# Patient Record
Sex: MALE | Race: OTHER | NOT HISPANIC OR LATINO
[De-identification: names, ages, dates, MRNs, and addresses within clinical notes are randomized per-mention and may not be internally consistent; named-entity substitution may affect disease eponyms.]

---

## 2017-01-11 ENCOUNTER — OTHER (OUTPATIENT)
Age: 27
End: 2017-01-11

## 2017-01-11 ENCOUNTER — LABORATORY RESULT (OUTPATIENT)
Age: 27
End: 2017-01-11

## 2017-01-12 LAB
25(OH)D3 SERPL-MCNC: 16 NG/ML
ALBUMIN SERPL ELPH-MCNC: 4.4 G/DL
ALP BLD-CCNC: 67 U/L
ALT SERPL-CCNC: 24 U/L
AMYLASE/CREAT SERPL: 46 U/L
ANION GAP SERPL CALC-SCNC: 14 MMOL/L
APPEARANCE: CLEAR
AST SERPL-CCNC: 16 U/L
BASOPHILS # BLD AUTO: 0.04 K/UL
BASOPHILS NFR BLD AUTO: 0.8 %
BILIRUB SERPL-MCNC: 0.8 MG/DL
BILIRUBIN URINE: NEGATIVE
BLOOD URINE: NEGATIVE
BUN SERPL-MCNC: 15 MG/DL
CALCIUM SERPL-MCNC: 9.4 MG/DL
CD3 CELLS # BLD: 2026 /UL
CD3 CELLS NFR BLD: 81 %
CD3+CD4+ CELLS # BLD: 622 /UL
CD3+CD4+ CELLS NFR BLD: 25 %
CD3+CD4+ CELLS/CD3+CD8+ CLL SPEC: 0.47 RATIO
CD3+CD8+ CELLS # SPEC: 1330 /UL
CD3+CD8+ CELLS NFR BLD: 53 %
CHLORIDE SERPL-SCNC: 103 MMOL/L
CHOLEST SERPL-MCNC: 196 MG/DL
CHOLEST/HDLC SERPL: 2.6 RATIO
CO2 SERPL-SCNC: 25 MMOL/L
COLOR: YELLOW
CREAT SERPL-MCNC: 1.26 MG/DL
EOSINOPHIL # BLD AUTO: 0.2 K/UL
EOSINOPHIL NFR BLD AUTO: 3.9 %
GLUCOSE QUALITATIVE U: NORMAL MG/DL
GLUCOSE SERPL-MCNC: 83 MG/DL
HAV IGG+IGM SER QL: REACTIVE
HBA1C MFR BLD HPLC: 5.5 %
HBV CORE IGG+IGM SER QL: NONREACTIVE
HBV SURFACE AB SERPL IA-ACNC: 48.2 MIU/ML
HBV SURFACE AG SER QL: NONREACTIVE
HCT VFR BLD CALC: 42.2 %
HCV AB SER QL: NONREACTIVE
HCV S/CO RATIO: 0.13 S/CO
HDLC SERPL-MCNC: 76 MG/DL
HGB BLD-MCNC: 14.2 G/DL
IMM GRANULOCYTES NFR BLD AUTO: 0.2 %
KETONES URINE: NEGATIVE
LDLC SERPL CALC-MCNC: 108 MG/DL
LEUKOCYTE ESTERASE URINE: NEGATIVE
LPL SERPL-CCNC: 20 U/L
LYMPHOCYTES # BLD AUTO: 2.28 K/UL
LYMPHOCYTES NFR BLD AUTO: 44.7 %
MAN DIFF?: NORMAL
MCHC RBC-ENTMCNC: 31.2 PG
MCHC RBC-ENTMCNC: 33.6 GM/DL
MCV RBC AUTO: 92.7 FL
MONOCYTES # BLD AUTO: 0.55 K/UL
MONOCYTES NFR BLD AUTO: 10.8 %
NEUTROPHILS # BLD AUTO: 2.02 K/UL
NEUTROPHILS NFR BLD AUTO: 39.6 %
NITRITE URINE: NEGATIVE
PH URINE: 8
PLATELET # BLD AUTO: 256 K/UL
POTASSIUM SERPL-SCNC: 4.7 MMOL/L
PROT SERPL-MCNC: 7.3 G/DL
PROTEIN URINE: NEGATIVE MG/DL
RBC # BLD: 4.55 M/UL
RBC # FLD: 13.3 %
SODIUM SERPL-SCNC: 142 MMOL/L
SPECIFIC GRAVITY URINE: 1.01
T PALLIDUM AB SER QL IA: POSITIVE
TRIGL SERPL-MCNC: 58 MG/DL
TSH SERPL-ACNC: 0.97 UIU/ML
UROBILINOGEN URINE: NORMAL MG/DL
WBC # FLD AUTO: 5.1 K/UL

## 2017-01-13 LAB
ADJUSTED MITOGEN: 6.05 IU/ML
ADJUSTED TB AG: 0 IU/ML
C TRACH DNA SPEC QL NAA+PROBE: NORMAL
C TRACH RRNA SPEC QL NAA+PROBE: NORMAL
HIV1 RNA # SERPL NAA+PROBE: NORMAL COPIES/ML
M TB IFN-G BLD-IMP: NEGATIVE
N GONORRHOEA DNA SPEC QL NAA+PROBE: NORMAL
N GONORRHOEA RRNA SPEC QL NAA+PROBE: NORMAL
QUANTIFERON GOLD NIL: 0.06 IU/ML
SOURCE AMPLIFICATION: NORMAL
VIRAL LOAD LOG: <1.6 LG10COP/ML

## 2017-01-31 DIAGNOSIS — R79.89 OTHER SPECIFIED ABNORMAL FINDINGS OF BLOOD CHEMISTRY: ICD-10-CM

## 2017-02-02 ENCOUNTER — OTHER (OUTPATIENT)
Age: 27
End: 2017-02-02

## 2017-02-06 ENCOUNTER — APPOINTMENT (OUTPATIENT)
Dept: PEDIATRIC ALLERGY IMMUNOLOGY | Facility: CLINIC | Age: 27
End: 2017-02-06

## 2017-02-06 ENCOUNTER — OUTPATIENT (OUTPATIENT)
Dept: OUTPATIENT SERVICES | Facility: HOSPITAL | Age: 27
LOS: 1 days | End: 2017-02-06
Payer: MEDICAID

## 2017-02-06 ENCOUNTER — NON-APPOINTMENT (OUTPATIENT)
Age: 27
End: 2017-02-06

## 2017-02-06 VITALS — SYSTOLIC BLOOD PRESSURE: 136 MMHG | DIASTOLIC BLOOD PRESSURE: 83 MMHG

## 2017-02-06 VITALS
BODY MASS INDEX: 27.93 KG/M2 | SYSTOLIC BLOOD PRESSURE: 148 MMHG | OXYGEN SATURATION: 97 % | WEIGHT: 203.99 LBS | HEIGHT: 71.57 IN | HEART RATE: 85 BPM | DIASTOLIC BLOOD PRESSURE: 83 MMHG

## 2017-02-06 DIAGNOSIS — Z23 ENCOUNTER FOR IMMUNIZATION: ICD-10-CM

## 2017-02-06 PROCEDURE — G0463: CPT

## 2017-02-06 PROCEDURE — 36415 COLL VENOUS BLD VENIPUNCTURE: CPT

## 2017-02-07 DIAGNOSIS — Z21 ASYMPTOMATIC HUMAN IMMUNODEFICIENCY VIRUS [HIV] INFECTION STATUS: ICD-10-CM

## 2017-02-07 DIAGNOSIS — Z11.3 ENCOUNTER FOR SCREENING FOR INFECTIONS WITH A PREDOMINANTLY SEXUAL MODE OF TRANSMISSION: ICD-10-CM

## 2017-02-07 DIAGNOSIS — R85.610 ATYPICAL SQUAMOUS CELLS OF UNDETERMINED SIGNIFICANCE ON CYTOLOGIC SMEAR OF ANUS (ASC-US): ICD-10-CM

## 2017-02-07 DIAGNOSIS — E55.9 VITAMIN D DEFICIENCY, UNSPECIFIED: ICD-10-CM

## 2017-02-07 DIAGNOSIS — J45.909 UNSPECIFIED ASTHMA, UNCOMPLICATED: ICD-10-CM

## 2017-02-08 LAB
C TRACH DNA SPEC QL NAA+PROBE: NORMAL
C TRACH DNA SPEC QL NAA+PROBE: NORMAL
C TRACH RRNA SPEC QL NAA+PROBE: NORMAL
C TRACH RRNA SPEC QL NAA+PROBE: NORMAL
N GONORRHOEA DNA SPEC QL NAA+PROBE: NORMAL
N GONORRHOEA DNA SPEC QL NAA+PROBE: NORMAL
N GONORRHOEA RRNA SPEC QL NAA+PROBE: NORMAL
N GONORRHOEA RRNA SPEC QL NAA+PROBE: NORMAL
SOURCE AMPLIFICATION: NORMAL
SOURCE AMPLIFICATION: NORMAL

## 2017-03-06 ENCOUNTER — NON-APPOINTMENT (OUTPATIENT)
Age: 27
End: 2017-03-06

## 2017-03-06 ENCOUNTER — APPOINTMENT (OUTPATIENT)
Dept: PEDIATRIC ALLERGY IMMUNOLOGY | Facility: CLINIC | Age: 27
End: 2017-03-06

## 2017-03-06 ENCOUNTER — APPOINTMENT (OUTPATIENT)
Dept: COLORECTAL SURGERY | Facility: CLINIC | Age: 27
End: 2017-03-06

## 2017-03-06 ENCOUNTER — OUTPATIENT (OUTPATIENT)
Dept: OUTPATIENT SERVICES | Facility: HOSPITAL | Age: 27
LOS: 1 days | End: 2017-03-06
Payer: MEDICAID

## 2017-03-06 VITALS
DIASTOLIC BLOOD PRESSURE: 85 MMHG | BODY MASS INDEX: 28.07 KG/M2 | HEART RATE: 74 BPM | HEIGHT: 71.57 IN | SYSTOLIC BLOOD PRESSURE: 132 MMHG | OXYGEN SATURATION: 98 % | WEIGHT: 204.99 LBS

## 2017-03-06 DIAGNOSIS — R85.610 ATYPICAL SQUAMOUS CELLS OF UNDETERMINED SIGNIFICANCE ON CYTOLOGIC SMEAR OF ANUS (ASC-US): ICD-10-CM

## 2017-03-06 PROCEDURE — 36415 COLL VENOUS BLD VENIPUNCTURE: CPT

## 2017-03-06 PROCEDURE — G0463: CPT

## 2017-04-03 ENCOUNTER — OTHER (OUTPATIENT)
Age: 27
End: 2017-04-03

## 2017-04-06 ENCOUNTER — RX RENEWAL (OUTPATIENT)
Age: 27
End: 2017-04-06

## 2017-04-12 ENCOUNTER — RX RENEWAL (OUTPATIENT)
Age: 27
End: 2017-04-12

## 2017-04-13 ENCOUNTER — RX RENEWAL (OUTPATIENT)
Age: 27
End: 2017-04-13

## 2017-04-13 RX ORDER — MOMETASONE FUROATE 110 UG/1
110 INHALANT RESPIRATORY (INHALATION)
Qty: 1 | Refills: 2 | Status: DISCONTINUED | COMMUNITY
Start: 2017-04-12 | End: 2017-04-13

## 2017-05-12 DIAGNOSIS — E55.9 VITAMIN D DEFICIENCY, UNSPECIFIED: ICD-10-CM

## 2017-05-12 DIAGNOSIS — Z21 ASYMPTOMATIC HUMAN IMMUNODEFICIENCY VIRUS [HIV] INFECTION STATUS: ICD-10-CM

## 2017-05-12 DIAGNOSIS — Z70.8 OTHER SEX COUNSELING: ICD-10-CM

## 2017-05-31 ENCOUNTER — RECORD ABSTRACTING (OUTPATIENT)
Age: 27
End: 2017-05-31

## 2017-06-05 ENCOUNTER — APPOINTMENT (OUTPATIENT)
Dept: PEDIATRIC ALLERGY IMMUNOLOGY | Facility: CLINIC | Age: 27
End: 2017-06-05

## 2017-06-07 ENCOUNTER — RECORD ABSTRACTING (OUTPATIENT)
Age: 27
End: 2017-06-07

## 2017-06-08 ENCOUNTER — APPOINTMENT (OUTPATIENT)
Dept: PEDIATRIC ALLERGY IMMUNOLOGY | Facility: CLINIC | Age: 27
End: 2017-06-08

## 2017-06-08 ENCOUNTER — OTHER (OUTPATIENT)
Age: 27
End: 2017-06-08

## 2017-06-08 ENCOUNTER — LABORATORY RESULT (OUTPATIENT)
Age: 27
End: 2017-06-08

## 2017-06-08 ENCOUNTER — OUTPATIENT (OUTPATIENT)
Dept: OUTPATIENT SERVICES | Facility: HOSPITAL | Age: 27
LOS: 1 days | End: 2017-06-08
Payer: MEDICAID

## 2017-06-08 VITALS
HEART RATE: 86 BPM | DIASTOLIC BLOOD PRESSURE: 82 MMHG | HEIGHT: 71.57 IN | WEIGHT: 203 LBS | SYSTOLIC BLOOD PRESSURE: 128 MMHG | OXYGEN SATURATION: 98 % | BODY MASS INDEX: 27.8 KG/M2

## 2017-06-08 PROCEDURE — G0463: CPT

## 2017-06-08 PROCEDURE — 36415 COLL VENOUS BLD VENIPUNCTURE: CPT

## 2017-06-08 RX ORDER — FEXOFENADINE HYDROCHLORIDE 180 MG/1
180 TABLET ORAL DAILY
Qty: 30 | Refills: 2 | Status: COMPLETED | COMMUNITY
Start: 2017-02-06 | End: 2017-06-08

## 2017-06-09 DIAGNOSIS — Z21 ASYMPTOMATIC HUMAN IMMUNODEFICIENCY VIRUS [HIV] INFECTION STATUS: ICD-10-CM

## 2017-06-09 DIAGNOSIS — Z11.3 ENCOUNTER FOR SCREENING FOR INFECTIONS WITH A PREDOMINANTLY SEXUAL MODE OF TRANSMISSION: ICD-10-CM

## 2017-06-09 DIAGNOSIS — E55.9 VITAMIN D DEFICIENCY, UNSPECIFIED: ICD-10-CM

## 2017-06-09 DIAGNOSIS — Z12.12 ENCOUNTER FOR SCREENING FOR MALIGNANT NEOPLASM OF RECTUM: ICD-10-CM

## 2017-06-09 DIAGNOSIS — Z51.81 ENCOUNTER FOR THERAPEUTIC DRUG LEVEL MONITORING: ICD-10-CM

## 2017-06-09 LAB
25(OH)D3 SERPL-MCNC: 33.8 NG/ML
ALBUMIN SERPL ELPH-MCNC: 4.8 G/DL
ALP BLD-CCNC: 87 U/L
ALT SERPL-CCNC: 46 U/L
ANION GAP SERPL CALC-SCNC: 15 MMOL/L
APPEARANCE: CLEAR
AST SERPL-CCNC: 30 U/L
BASOPHILS # BLD AUTO: 0.02 K/UL
BASOPHILS NFR BLD AUTO: 0.4 %
BILIRUB SERPL-MCNC: 0.4 MG/DL
BILIRUBIN URINE: NEGATIVE
BLOOD URINE: NEGATIVE
BUN SERPL-MCNC: 11 MG/DL
CALCIUM SERPL-MCNC: 9.5 MG/DL
CHLORIDE SERPL-SCNC: 103 MMOL/L
CHOLEST SERPL-MCNC: 167 MG/DL
CHOLEST/HDLC SERPL: 1.9 RATIO
CO2 SERPL-SCNC: 24 MMOL/L
COLOR: YELLOW
CORE LAB FLUID CYTOLOGY: NORMAL
CREAT SERPL-MCNC: 1.14 MG/DL
EOSINOPHIL # BLD AUTO: 0.21 K/UL
EOSINOPHIL NFR BLD AUTO: 3.9 %
GLUCOSE QUALITATIVE U: NORMAL MG/DL
GLUCOSE SERPL-MCNC: 88 MG/DL
HCT VFR BLD CALC: 41.2 %
HDLC SERPL-MCNC: 86 MG/DL
HGB BLD-MCNC: 14.1 G/DL
IMM GRANULOCYTES NFR BLD AUTO: 0.2 %
KETONES URINE: NEGATIVE
LDLC SERPL CALC-MCNC: 59 MG/DL
LEUKOCYTE ESTERASE URINE: NEGATIVE
LPL SERPL-CCNC: 21 U/L
LYMPHOCYTES # BLD AUTO: 1.59 K/UL
LYMPHOCYTES NFR BLD AUTO: 29.2 %
MAN DIFF?: NORMAL
MCHC RBC-ENTMCNC: 32.3 PG
MCHC RBC-ENTMCNC: 34.2 GM/DL
MCV RBC AUTO: 94.3 FL
MONOCYTES # BLD AUTO: 0.63 K/UL
MONOCYTES NFR BLD AUTO: 11.6 %
NEUTROPHILS # BLD AUTO: 2.98 K/UL
NEUTROPHILS NFR BLD AUTO: 54.7 %
NITRITE URINE: NEGATIVE
PH URINE: 7.5
PHOSPHATE SERPL-MCNC: 3.3 MG/DL
PLATELET # BLD AUTO: 296 K/UL
POTASSIUM SERPL-SCNC: 4.7 MMOL/L
PROT SERPL-MCNC: 7.6 G/DL
PROTEIN URINE: NEGATIVE MG/DL
RBC # BLD: 4.37 M/UL
RBC # FLD: 12.9 %
SODIUM SERPL-SCNC: 142 MMOL/L
SPECIFIC GRAVITY URINE: 1.01
T PALLIDUM AB SER QL IA: POSITIVE
TRIGL SERPL-MCNC: 112 MG/DL
UROBILINOGEN URINE: NORMAL MG/DL
WBC # FLD AUTO: 5.44 K/UL

## 2017-06-12 LAB
C TRACH RRNA SPEC QL NAA+PROBE: NORMAL
C TRACH RRNA SPEC QL NAA+PROBE: NORMAL
CD3 CELLS # BLD: 1249 /UL
CD3 CELLS NFR BLD: 74 %
CD3+CD4+ CELLS # BLD: 473 /UL
CD3+CD4+ CELLS NFR BLD: 28 %
CD3+CD4+ CELLS/CD3+CD8+ CLL SPEC: 0.63 RATIO
CD3+CD8+ CELLS # SPEC: 753 /UL
CD3+CD8+ CELLS NFR BLD: 45 %
N GONORRHOEA RRNA SPEC QL NAA+PROBE: NORMAL
N GONORRHOEA RRNA SPEC QL NAA+PROBE: NORMAL
SOURCE AMPLIFICATION: NORMAL
SOURCE AMPLIFICATION: NORMAL

## 2017-06-15 ENCOUNTER — RECORD ABSTRACTING (OUTPATIENT)
Age: 27
End: 2017-06-15

## 2017-08-24 ENCOUNTER — RECORD ABSTRACTING (OUTPATIENT)
Age: 27
End: 2017-08-24

## 2017-08-24 LAB
HIV1 RNA # SERPL NAA+PROBE: NOT DETECTED COPIES/ML
VIRAL LOAD LOG: NOT DETECTED LG10COP/ML

## 2017-09-12 ENCOUNTER — LABORATORY RESULT (OUTPATIENT)
Age: 27
End: 2017-09-12

## 2017-09-12 ENCOUNTER — OUTPATIENT (OUTPATIENT)
Dept: OUTPATIENT SERVICES | Facility: HOSPITAL | Age: 27
LOS: 1 days | End: 2017-09-12
Payer: MEDICAID

## 2017-09-12 ENCOUNTER — APPOINTMENT (OUTPATIENT)
Dept: PEDIATRIC ALLERGY IMMUNOLOGY | Facility: CLINIC | Age: 27
End: 2017-09-12
Payer: MEDICAID

## 2017-09-12 VITALS
HEART RATE: 76 BPM | HEIGHT: 71.57 IN | SYSTOLIC BLOOD PRESSURE: 136 MMHG | DIASTOLIC BLOOD PRESSURE: 87 MMHG | BODY MASS INDEX: 27.11 KG/M2 | OXYGEN SATURATION: 98 % | WEIGHT: 198 LBS

## 2017-09-12 VITALS — TEMPERATURE: 96.8 F

## 2017-09-12 DIAGNOSIS — R09.81 NASAL CONGESTION: ICD-10-CM

## 2017-09-12 PROCEDURE — 99215 OFFICE O/P EST HI 40 MIN: CPT

## 2017-09-12 PROCEDURE — G0463: CPT

## 2017-09-12 PROCEDURE — 36415 COLL VENOUS BLD VENIPUNCTURE: CPT

## 2017-09-13 DIAGNOSIS — Z71.7 HUMAN IMMUNODEFICIENCY VIRUS [HIV] COUNSELING: ICD-10-CM

## 2017-09-13 DIAGNOSIS — B20 HUMAN IMMUNODEFICIENCY VIRUS [HIV] DISEASE: ICD-10-CM

## 2017-09-13 DIAGNOSIS — Z86.39 PERSONAL HISTORY OF OTHER ENDOCRINE, NUTRITIONAL AND METABOLIC DISEASE: ICD-10-CM

## 2017-09-13 DIAGNOSIS — R09.81 NASAL CONGESTION: ICD-10-CM

## 2017-09-13 DIAGNOSIS — Z51.81 ENCOUNTER FOR THERAPEUTIC DRUG LEVEL MONITORING: ICD-10-CM

## 2017-09-13 DIAGNOSIS — Z11.3 ENCOUNTER FOR SCREENING FOR INFECTIONS WITH A PREDOMINANTLY SEXUAL MODE OF TRANSMISSION: ICD-10-CM

## 2017-09-14 DIAGNOSIS — R79.89 OTHER SPECIFIED ABNORMAL FINDINGS OF BLOOD CHEMISTRY: ICD-10-CM

## 2017-09-15 LAB
ALBUMIN SERPL ELPH-MCNC: 4.4 G/DL
ALP BLD-CCNC: 86 U/L
ALT SERPL-CCNC: 95 U/L
ANION GAP SERPL CALC-SCNC: 13 MMOL/L
APPEARANCE: ABNORMAL
AST SERPL-CCNC: 64 U/L
BASOPHILS # BLD AUTO: 0.02 K/UL
BASOPHILS NFR BLD AUTO: 0.4 %
BILIRUB SERPL-MCNC: 0.5 MG/DL
BILIRUBIN URINE: NEGATIVE
BLOOD URINE: NEGATIVE
BUN SERPL-MCNC: 15 MG/DL
C TRACH RRNA SPEC QL NAA+PROBE: NORMAL
C TRACH RRNA SPEC QL NAA+PROBE: NORMAL
CALCIUM SERPL-MCNC: 9.7 MG/DL
CD3 CELLS # BLD: 1743 /UL
CD3 CELLS NFR BLD: 82 %
CD3+CD4+ CELLS # BLD: 635 /UL
CD3+CD4+ CELLS NFR BLD: 30 %
CD3+CD4+ CELLS/CD3+CD8+ CLL SPEC: 0.59 RATIO
CD3+CD8+ CELLS # SPEC: 1069 /UL
CD3+CD8+ CELLS NFR BLD: 50 %
CHLORIDE SERPL-SCNC: 101 MMOL/L
CHOLEST SERPL-MCNC: 166 MG/DL
CHOLEST/HDLC SERPL: 1.7 RATIO
CO2 SERPL-SCNC: 26 MMOL/L
COLOR: YELLOW
CREAT SERPL-MCNC: 1.1 MG/DL
EOSINOPHIL # BLD AUTO: 0.2 K/UL
EOSINOPHIL NFR BLD AUTO: 4.2 %
GLUCOSE QUALITATIVE U: NORMAL MG/DL
GLUCOSE SERPL-MCNC: 78 MG/DL
HCT VFR BLD CALC: 41 %
HDLC SERPL-MCNC: 96 MG/DL
HGB BLD-MCNC: 13.8 G/DL
HIV1 RNA # SERPL NAA+PROBE: <30 COPIES/ML
HIV1 RNA # SERPL NAA+PROBE: ABNORMAL
IMM GRANULOCYTES NFR BLD AUTO: 0.2 %
KETONES URINE: NEGATIVE
LDLC SERPL CALC-MCNC: 57 MG/DL
LEUKOCYTE ESTERASE URINE: NEGATIVE
LPL SERPL-CCNC: 23 U/L
LYMPHOCYTES # BLD AUTO: 2.05 K/UL
LYMPHOCYTES NFR BLD AUTO: 43.2 %
MAN DIFF?: NORMAL
MCHC RBC-ENTMCNC: 32.4 PG
MCHC RBC-ENTMCNC: 33.7 GM/DL
MCV RBC AUTO: 96.2 FL
MONOCYTES # BLD AUTO: 0.65 K/UL
MONOCYTES NFR BLD AUTO: 13.7 %
N GONORRHOEA RRNA SPEC QL NAA+PROBE: NORMAL
N GONORRHOEA RRNA SPEC QL NAA+PROBE: NORMAL
NEUTROPHILS # BLD AUTO: 1.81 K/UL
NEUTROPHILS NFR BLD AUTO: 38.3 %
NITRITE URINE: NEGATIVE
PH URINE: 7.5
PLATELET # BLD AUTO: 251 K/UL
POTASSIUM SERPL-SCNC: 4.3 MMOL/L
PROT SERPL-MCNC: 7.4 G/DL
PROTEIN URINE: NEGATIVE MG/DL
RBC # BLD: 4.26 M/UL
RBC # FLD: 13 %
SODIUM SERPL-SCNC: 140 MMOL/L
SOURCE AMPLIFICATION: NORMAL
SOURCE AMPLIFICATION: NORMAL
SPECIFIC GRAVITY URINE: 1.01
T PALLIDUM AB SER QL IA: POSITIVE
TRIGL SERPL-MCNC: 67 MG/DL
UROBILINOGEN URINE: NORMAL MG/DL
VIRAL LOAD INTERP: NORMAL
VIRAL LOAD LOG: <1.47 LG COP/ML
WBC # FLD AUTO: 4.74 K/UL

## 2017-11-28 ENCOUNTER — RX RENEWAL (OUTPATIENT)
Age: 27
End: 2017-11-28

## 2018-01-31 ENCOUNTER — OTHER (OUTPATIENT)
Age: 28
End: 2018-01-31

## 2018-04-11 ENCOUNTER — RX RENEWAL (OUTPATIENT)
Age: 28
End: 2018-04-11

## 2018-04-19 ENCOUNTER — LABORATORY RESULT (OUTPATIENT)
Age: 28
End: 2018-04-19

## 2018-04-19 ENCOUNTER — APPOINTMENT (OUTPATIENT)
Dept: PEDIATRIC ALLERGY IMMUNOLOGY | Facility: CLINIC | Age: 28
End: 2018-04-19
Payer: COMMERCIAL

## 2018-04-19 ENCOUNTER — MED ADMIN CHARGE (OUTPATIENT)
Age: 28
End: 2018-04-19

## 2018-04-19 ENCOUNTER — OUTPATIENT (OUTPATIENT)
Dept: OUTPATIENT SERVICES | Facility: HOSPITAL | Age: 28
LOS: 1 days | End: 2018-04-19
Payer: COMMERCIAL

## 2018-04-19 VITALS
WEIGHT: 205.6 LBS | SYSTOLIC BLOOD PRESSURE: 137 MMHG | HEART RATE: 70 BPM | DIASTOLIC BLOOD PRESSURE: 84 MMHG | BODY MASS INDEX: 28.22 KG/M2

## 2018-04-19 DIAGNOSIS — Z71.7 HUMAN IMMUNODEFICIENCY VIRUS [HIV] COUNSELING: ICD-10-CM

## 2018-04-19 DIAGNOSIS — Z23 ENCOUNTER FOR IMMUNIZATION: ICD-10-CM

## 2018-04-19 DIAGNOSIS — F12.90 CANNABIS USE, UNSPECIFIED, UNCOMPLICATED: ICD-10-CM

## 2018-04-19 DIAGNOSIS — Z78.9 OTHER SPECIFIED HEALTH STATUS: ICD-10-CM

## 2018-04-19 DIAGNOSIS — Z12.12 ENCOUNTER FOR SCREENING FOR MALIGNANT NEOPLASM OF RECTUM: ICD-10-CM

## 2018-04-19 PROCEDURE — 90471 IMMUNIZATION ADMIN: CPT

## 2018-04-19 PROCEDURE — 99215 OFFICE O/P EST HI 40 MIN: CPT

## 2018-04-19 PROCEDURE — 36415 COLL VENOUS BLD VENIPUNCTURE: CPT

## 2018-04-19 PROCEDURE — G0463: CPT | Mod: 25

## 2018-04-19 PROCEDURE — 90732 PPSV23 VACC 2 YRS+ SUBQ/IM: CPT

## 2018-04-19 RX ORDER — BROMPHENIRAMINE MALEATE, PSEUDOEPHEDRINE HYDROCHLORIDE, 2; 30; 10 MG/5ML; MG/5ML; MG/5ML
30-2-10 SYRUP ORAL
Qty: 236 | Refills: 0 | Status: DISCONTINUED | COMMUNITY
Start: 2018-02-08

## 2018-04-19 RX ORDER — AMOXICILLIN AND CLAVULANATE POTASSIUM 875; 125 MG/1; MG/1
875-125 TABLET, COATED ORAL
Qty: 20 | Refills: 0 | Status: DISCONTINUED | COMMUNITY
Start: 2018-02-08

## 2018-04-20 LAB
25(OH)D3 SERPL-MCNC: 25.1 NG/ML
ALBUMIN SERPL ELPH-MCNC: 4.6 G/DL
ALP BLD-CCNC: 65 U/L
ALT SERPL-CCNC: 53 U/L
ANION GAP SERPL CALC-SCNC: 17 MMOL/L
APPEARANCE: CLEAR
AST SERPL-CCNC: 36 U/L
BASOPHILS # BLD AUTO: 0.04 K/UL
BASOPHILS NFR BLD AUTO: 0.6 %
BILIRUB SERPL-MCNC: 0.5 MG/DL
BILIRUBIN URINE: NEGATIVE
BLOOD URINE: NEGATIVE
BUN SERPL-MCNC: 11 MG/DL
C TRACH RRNA SPEC QL NAA+PROBE: NOT DETECTED
CALCIUM SERPL-MCNC: 9.5 MG/DL
CD3 CELLS # BLD: 2069 /UL
CD3 CELLS NFR BLD: 76 %
CD3+CD4+ CELLS # BLD: 798 /UL
CD3+CD4+ CELLS NFR BLD: 29 %
CD3+CD4+ CELLS/CD3+CD8+ CLL SPEC: 0.64 RATIO
CD3+CD8+ CELLS # SPEC: 1252 /UL
CD3+CD8+ CELLS NFR BLD: 46 %
CHLORIDE SERPL-SCNC: 101 MMOL/L
CHOLEST SERPL-MCNC: 159 MG/DL
CHOLEST/HDLC SERPL: 1.9 RATIO
CO2 SERPL-SCNC: 23 MMOL/L
COLOR: YELLOW
CREAT SERPL-MCNC: 1.08 MG/DL
EOSINOPHIL # BLD AUTO: 0.59 K/UL
EOSINOPHIL NFR BLD AUTO: 8.5 %
GLUCOSE QUALITATIVE U: NEGATIVE MG/DL
GLUCOSE SERPL-MCNC: 90 MG/DL
HAV IGG+IGM SER QL: REACTIVE
HBA1C MFR BLD HPLC: 5.2 %
HBV CORE IGG+IGM SER QL: NONREACTIVE
HBV SURFACE AB SERPL IA-ACNC: 15.7 MIU/ML
HBV SURFACE AG SER QL: NONREACTIVE
HCT VFR BLD CALC: 47 %
HCV AB SER QL: NONREACTIVE
HCV S/CO RATIO: 0.09 S/CO
HDLC SERPL-MCNC: 85 MG/DL
HGB BLD-MCNC: 15.4 G/DL
HIV1 RNA # SERPL NAA+PROBE: ABNORMAL
HIV1 RNA # SERPL NAA+PROBE: ABNORMAL COPIES/ML
IMM GRANULOCYTES NFR BLD AUTO: 0.1 %
KETONES URINE: NEGATIVE
LDLC SERPL CALC-MCNC: 65 MG/DL
LEUKOCYTE ESTERASE URINE: NEGATIVE
LPL SERPL-CCNC: 15 U/L
LYMPHOCYTES # BLD AUTO: 2.74 K/UL
LYMPHOCYTES NFR BLD AUTO: 39.3 %
MAN DIFF?: NORMAL
MCHC RBC-ENTMCNC: 32.4 PG
MCHC RBC-ENTMCNC: 32.8 GM/DL
MCV RBC AUTO: 98.9 FL
MONOCYTES # BLD AUTO: 0.53 K/UL
MONOCYTES NFR BLD AUTO: 7.6 %
N GONORRHOEA RRNA SPEC QL NAA+PROBE: NOT DETECTED
NEUTROPHILS # BLD AUTO: 3.07 K/UL
NEUTROPHILS NFR BLD AUTO: 43.9 %
NITRITE URINE: NEGATIVE
PH URINE: 7.5
PLATELET # BLD AUTO: 311 K/UL
POTASSIUM SERPL-SCNC: 4.4 MMOL/L
PROT SERPL-MCNC: 7.7 G/DL
PROTEIN URINE: NEGATIVE MG/DL
RBC # BLD: 4.75 M/UL
RBC # FLD: 13.2 %
SODIUM SERPL-SCNC: 141 MMOL/L
SOURCE AMPLIFICATION: NORMAL
SOURCE ANAL: NORMAL
SOURCE ORAL: NORMAL
SPECIFIC GRAVITY URINE: 1.01
T PALLIDUM AB SER QL IA: POSITIVE
TRIGL SERPL-MCNC: 47 MG/DL
UROBILINOGEN URINE: NEGATIVE MG/DL
VIRAL LOAD INTERP: NORMAL
VIRAL LOAD LOG: ABNORMAL LG COP/ML
WBC # FLD AUTO: 6.98 K/UL

## 2018-04-23 DIAGNOSIS — Z09 ENCOUNTER FOR FOLLOW-UP EXAMINATION AFTER COMPLETED TREATMENT FOR CONDITIONS OTHER THAN MALIGNANT NEOPLASM: ICD-10-CM

## 2018-04-23 DIAGNOSIS — J45.909 UNSPECIFIED ASTHMA, UNCOMPLICATED: ICD-10-CM

## 2018-04-23 DIAGNOSIS — Z70.8 OTHER SEX COUNSELING: ICD-10-CM

## 2018-04-23 DIAGNOSIS — Z12.12 ENCOUNTER FOR SCREENING FOR MALIGNANT NEOPLASM OF RECTUM: ICD-10-CM

## 2018-04-23 DIAGNOSIS — Z86.39 PERSONAL HISTORY OF OTHER ENDOCRINE, NUTRITIONAL AND METABOLIC DISEASE: ICD-10-CM

## 2018-04-23 DIAGNOSIS — B20 HUMAN IMMUNODEFICIENCY VIRUS [HIV] DISEASE: ICD-10-CM

## 2018-04-23 DIAGNOSIS — Z71.7 HUMAN IMMUNODEFICIENCY VIRUS [HIV] COUNSELING: ICD-10-CM

## 2018-04-23 DIAGNOSIS — Z23 ENCOUNTER FOR IMMUNIZATION: ICD-10-CM

## 2018-04-23 DIAGNOSIS — Z11.3 ENCOUNTER FOR SCREENING FOR INFECTIONS WITH A PREDOMINANTLY SEXUAL MODE OF TRANSMISSION: ICD-10-CM

## 2018-04-23 LAB
ADJUSTED MITOGEN: >10 IU/ML
ADJUSTED TB AG: -0.02 IU/ML
M TB IFN-G BLD-IMP: NEGATIVE
QUANTIFERON GOLD NIL: 0.12 IU/ML

## 2018-10-05 ENCOUNTER — RX RENEWAL (OUTPATIENT)
Age: 28
End: 2018-10-05

## 2019-02-21 ENCOUNTER — APPOINTMENT (OUTPATIENT)
Dept: INTERNAL MEDICINE | Facility: CLINIC | Age: 29
End: 2019-02-21
Payer: COMMERCIAL

## 2019-02-21 VITALS
SYSTOLIC BLOOD PRESSURE: 120 MMHG | BODY MASS INDEX: 29.68 KG/M2 | HEIGHT: 71 IN | DIASTOLIC BLOOD PRESSURE: 72 MMHG | WEIGHT: 212 LBS

## 2019-02-21 DIAGNOSIS — Z11.3 ENCOUNTER FOR SCREENING FOR INFECTIONS WITH A PREDOMINANTLY SEXUAL MODE OF TRANSMISSION: ICD-10-CM

## 2019-02-21 PROCEDURE — 99205 OFFICE O/P NEW HI 60 MIN: CPT | Mod: 25

## 2019-02-21 PROCEDURE — 99358 PROLONG SERVICE W/O CONTACT: CPT

## 2019-02-21 RX ORDER — ADHESIVE TAPE 3"X 2.3 YD
50 MCG TAPE, NON-MEDICATED TOPICAL
Qty: 30 | Refills: 3 | Status: DISCONTINUED | COMMUNITY
Start: 2017-02-06 | End: 2019-02-21

## 2019-02-21 RX ORDER — MOMETASONE FUROATE 100 UG/1
100 AEROSOL RESPIRATORY (INHALATION) TWICE DAILY
Qty: 1 | Refills: 3 | Status: DISCONTINUED | COMMUNITY
Start: 2017-04-13 | End: 2019-02-21

## 2019-02-21 RX ORDER — CETIRIZINE HYDROCHLORIDE 10 MG/1
10 TABLET, COATED ORAL
Qty: 30 | Refills: 3 | Status: DISCONTINUED | COMMUNITY
Start: 2017-04-12 | End: 2019-02-21

## 2019-02-21 NOTE — HISTORY OF PRESENT ILLNESS
[FreeTextEntry1] : Patient is a 28-year-old white male homosexual who is here for transition of care to a new HIV physician. Patient was diagnosed approximately 4 years ago. At that time he was also positive and chlamydia and possibly syphilis. He is unaware of viral load and T-cell count at that time but states he was otherwise healthy. He was initially treated with stribild and then changed to genvoya. He has not had any recent STDs and is otherwise healthy except for asthma.\par He practices unprotected sexual relations with his stable partner who is HIV negative. However there have an open relationship but he states he only performs oral intercourse and not anal with other partners. His stable partner is not on prep despite being advised to by other physicians\par \par  [Sexually Active] : The patient is sexually active [Monogamous] : is not monogamous [Condom Use] : not using condoms [Oral] : oral [de-identified] : History of syphilis and gonorrhea in the past with  the current time and last GC screen was negative [de-identified] : partner [de-identified] : parents,partner

## 2019-02-21 NOTE — ASSESSMENT
[FreeTextEntry1] : Patient is a long-standing HIV positive currently suppressed on current regimen of genvoya. He states he had recent viral load done in outside lab that was undetectable. He is unaware of his CD4 count and will recheck in labs.\par \par Patient was counseled regarding STD prevention. He states he only engages in oral sex outside of his stable relationship with his partner. The same is true for his partner. He does not engage in any other risky behavior and is compliant with all medications. He's had a negative anall Pap in the past and I do not think he needs one at the current time.\par \par He only received 2 doses of HPV vaccination but that should be sufficient as he cannot get a third dose based on his age\par \par We had a long discussion regarding HIV and is excellent long-term prognosis as long as he is compliant on his medications. I discussed with the patient that I believe genvoya is a potentially suboptimal medication due to significant drug interactions with other medicines. I discussed BIKTARVY with him and the safety profile and we'll make changes today. He will have full labs performed in 4 months and followup at that time.\par \par All issues regarding patient's health and medical problems have been discussed. The patient understands and concurs with the treatment plan.

## 2019-02-21 NOTE — HISTORY OF PRESENT ILLNESS
[FreeTextEntry1] : Patient is a 28-year-old white male homosexual who is here for transition of care to a new HIV physician. Patient was diagnosed approximately 4 years ago. At that time he was also positive and chlamydia and possibly syphilis. He is unaware of viral load and T-cell count at that time but states he was otherwise healthy. He was initially treated with stribild and then changed to genvoya. He has not had any recent STDs and is otherwise healthy except for asthma.\par He practices unprotected sexual relations with his stable partner who is HIV negative. However there have an open relationship but he states he only performs oral intercourse and not anal with other partners. His stable partner is not on prep despite being advised to by other physicians\par \par  [Sexually Active] : The patient is sexually active [Monogamous] : is not monogamous [Condom Use] : not using condoms [Oral] : oral [de-identified] : History of syphilis and gonorrhea in the past with  the current time and last GC screen was negative [de-identified] : partner [de-identified] : parents,partner

## 2019-02-21 NOTE — HISTORY OF PRESENT ILLNESS
[FreeTextEntry1] : Patient is a 28-year-old white male homosexual who is here for transition of care to a new HIV physician. Patient was diagnosed approximately 4 years ago. At that time he was also positive and chlamydia and possibly syphilis. He is unaware of viral load and T-cell count at that time but states he was otherwise healthy. He was initially treated with stribild and then changed to genvoya. He has not had any recent STDs and is otherwise healthy except for asthma.\par He practices unprotected sexual relations with his stable partner who is HIV negative. However there have an open relationship but he states he only performs oral intercourse and not anal with other partners. His stable partner is not on prep despite being advised to by other physicians\par \par  [Sexually Active] : The patient is sexually active [Monogamous] : is not monogamous [Condom Use] : not using condoms [Oral] : oral [de-identified] : History of syphilis and gonorrhea in the past with  the current time and last GC screen was negative [de-identified] : partner [de-identified] : parents,partner

## 2019-02-21 NOTE — DATA REVIEWED
[FreeTextEntry1] : All records from [facility] and laboratory data were reviewed. His viral load is undetectable his T-cell count needs to be rechecked. His RPR is 1:1 indicative of old syphilis. He is hepatitis B surface antibody positive, hepatitisA IGg she positive and hep C negative\par \par Extensive medical records were reviewed both consultative and clinic notes from [facility] and laboratory data going back for 3 years in all consultative notes prior to evaluation of the patient \par In addition extensive time was also spent in reviewing diagnostic studies.\par \par The duration of the review took 40 minutes\par \par \par \par

## 2019-02-21 NOTE — DATA REVIEWED
[FreeTextEntry1] : All records from [facility] and laboratory data were reviewed. His viral load is undetectable his T-cell count needs to be rechecked. His RPR is 1:1 indicative of old syphilis. He is hepatitis B surface antibody positive, hepatitisA IGg she positive and hep C negative\par \par Extensive medical records were reviewed both consultative and clinic notes from [facility] and laboratory data going back for 3 years in all consultative notes prior to evaluation of the patient \par In addition extensive time was also spent in reviewing diagnostic studies.\par \par The duration of the review took 40 minutes\par \par \par \par

## 2019-02-21 NOTE — PHYSICAL EXAM
[General Appearance - Alert] : alert [General Appearance - In No Acute Distress] : in no acute distress [Sclera] : the sclera and conjunctiva were normal [PERRL With Normal Accommodation] : pupils were equal in size, round, reactive to light [Extraocular Movements] : extraocular movements were intact [Outer Ear] : the ears and nose were normal in appearance [Oropharynx] : the oropharynx was normal with no thrush [Neck Appearance] : the appearance of the neck was normal [Neck Cervical Mass (___cm)] : no neck mass was observed [Jugular Venous Distention Increased] : there was no jugular-venous distention [Thyroid Diffuse Enlargement] : the thyroid was not enlarged [Auscultation Breath Sounds / Voice Sounds] : lungs were clear to auscultation bilaterally [Heart Rate And Rhythm] : heart rate was normal and rhythm regular [Heart Sounds] : normal S1 and S2 [Heart Sounds Gallop] : no gallops [Murmurs] : no murmurs [Heart Sounds Pericardial Friction Rub] : no pericardial rub [Full Pulse] : the pedal pulses are present [Edema] : there was no peripheral edema [Bowel Sounds] : normal bowel sounds [Abdomen Soft] : soft [Abdomen Tenderness] : non-tender [Abdomen Mass (___ Cm)] : no abdominal mass palpated [Costovertebral Angle Tenderness] : no CVA tenderness [No Palpable Adenopathy] : no palpable adenopathy [Musculoskeletal - Swelling] : no joint swelling [Nail Clubbing] : no clubbing  or cyanosis of the fingernails [Motor Tone] : muscle strength and tone were normal [Skin Color & Pigmentation] : normal skin color and pigmentation [] : no rash [Deep Tendon Reflexes (DTR)] : deep tendon reflexes were 2+ and symmetric [Sensation] : the sensory exam was normal to light touch and pinprick [No Focal Deficits] : no focal deficits

## 2019-06-18 ENCOUNTER — APPOINTMENT (OUTPATIENT)
Dept: INTERNAL MEDICINE | Facility: CLINIC | Age: 29
End: 2019-06-18
Payer: COMMERCIAL

## 2019-06-18 VITALS
WEIGHT: 202 LBS | SYSTOLIC BLOOD PRESSURE: 130 MMHG | HEIGHT: 71 IN | BODY MASS INDEX: 28.28 KG/M2 | DIASTOLIC BLOOD PRESSURE: 70 MMHG

## 2019-06-18 DIAGNOSIS — Z23 ENCOUNTER FOR IMMUNIZATION: ICD-10-CM

## 2019-06-18 PROCEDURE — 99214 OFFICE O/P EST MOD 30 MIN: CPT

## 2019-06-18 NOTE — HISTORY OF PRESENT ILLNESS
[Sexually Active] : The patient is sexually active [FreeTextEntry1] :  DX HIV 10/2014 - ROUTINE LABS = SEX CONTACT WAS POSITIVE\par ? 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 He is compliant with his medications and has not engaged in any mainly receptive risky behavior. He did mention he never received full dosing of HPV vaccination.\par

## 2019-06-18 NOTE — ASSESSMENT
[FreeTextEntry1] : Patient seen for  evaluation of HIV disease. Prior labs have been reviewed and discussed with the patient.  Compliant with all medications. Viral load remains tractable in the past with new labs pending from today's visit and T-cell count remains 718 from 2018 with new labs pending The patient is up to date in vaccines in all issues surrounding their illness had been discussed and all questions answered.  Goals of therapy and issues of transmissibility as well as STD prevention were discussed in depth. Patient was conversant and all relevant questions were asked and answered .The patient has been instructed to followup here 6 months\par \par In addition patient only received 2 doses of HPV vaccination was one verified on his chart. Based on new CDC guidelines DAH of vaccinations that extended to 45. Patient will check with his insurance company regarding a new series of gauze he'll x3 injections. Patient is considered high risk and would benefit from vaccine.\par \par All issues regarding patient's health and medical problems have been discussed. The patient understands and concurs with the treatment plan.

## 2019-06-19 LAB
ALBUMIN SERPL ELPH-MCNC: 4.6 G/DL
ALP BLD-CCNC: 70 U/L
ALT SERPL-CCNC: 29 U/L
ANION GAP SERPL CALC-SCNC: 14 MMOL/L
AST SERPL-CCNC: 18 U/L
BASOPHILS # BLD AUTO: 0.06 K/UL
BASOPHILS NFR BLD AUTO: 0.7 %
BILIRUB SERPL-MCNC: 0.5 MG/DL
BUN SERPL-MCNC: 12 MG/DL
CALCIUM SERPL-MCNC: 8.9 MG/DL
CD3 CELLS # BLD: 1305 /UL
CD3 CELLS NFR BLD: 75 %
CD3+CD4+ CELLS # BLD: 550 /UL
CD3+CD4+ CELLS NFR BLD: 32 %
CD3+CD4+ CELLS/CD3+CD8+ CLL SPEC: 0.76 RATIO
CD3+CD8+ CELLS # SPEC: 721 /UL
CD3+CD8+ CELLS NFR BLD: 42 %
CHLORIDE SERPL-SCNC: 104 MMOL/L
CO2 SERPL-SCNC: 24 MMOL/L
CREAT SERPL-MCNC: 1.2 MG/DL
EOSINOPHIL # BLD AUTO: 0.33 K/UL
EOSINOPHIL NFR BLD AUTO: 4 %
GLUCOSE SERPL-MCNC: 91 MG/DL
HCT VFR BLD CALC: 44.1 %
HGB BLD-MCNC: 14.6 G/DL
HIV1 RNA # SERPL NAA+PROBE: 32
HIV1 RNA # SERPL NAA+PROBE: ABNORMAL
IMM GRANULOCYTES NFR BLD AUTO: 0.4 %
LYMPHOCYTES # BLD AUTO: 1.86 K/UL
LYMPHOCYTES NFR BLD AUTO: 22.3 %
MAN DIFF?: NORMAL
MCHC RBC-ENTMCNC: 32.2 PG
MCHC RBC-ENTMCNC: 33.1 GM/DL
MCV RBC AUTO: 97.4 FL
MONOCYTES # BLD AUTO: 0.72 K/UL
MONOCYTES NFR BLD AUTO: 8.6 %
NEUTROPHILS # BLD AUTO: 5.35 K/UL
NEUTROPHILS NFR BLD AUTO: 64 %
PLATELET # BLD AUTO: 260 K/UL
POTASSIUM SERPL-SCNC: 4.5 MMOL/L
PROT SERPL-MCNC: 7 G/DL
RBC # BLD: 4.53 M/UL
RBC # FLD: 12.3 %
SODIUM SERPL-SCNC: 142 MMOL/L
VIRAL LOAD INTERP: NORMAL
VIRAL LOAD LOG: 1.5
WBC # FLD AUTO: 8.35 K/UL

## 2019-08-11 ENCOUNTER — TRANSCRIPTION ENCOUNTER (OUTPATIENT)
Age: 29
End: 2019-08-11

## 2019-12-09 ENCOUNTER — APPOINTMENT (OUTPATIENT)
Dept: INTERNAL MEDICINE | Facility: CLINIC | Age: 29
End: 2019-12-09

## 2020-03-18 ENCOUNTER — APPOINTMENT (OUTPATIENT)
Dept: INTERNAL MEDICINE | Facility: CLINIC | Age: 30
End: 2020-03-18
Payer: COMMERCIAL

## 2020-03-18 VITALS
HEIGHT: 71 IN | DIASTOLIC BLOOD PRESSURE: 80 MMHG | BODY MASS INDEX: 31.64 KG/M2 | SYSTOLIC BLOOD PRESSURE: 130 MMHG | WEIGHT: 226 LBS

## 2020-03-18 PROCEDURE — 99215 OFFICE O/P EST HI 40 MIN: CPT

## 2020-03-18 RX ORDER — FLUTICASONE FUROATE AND VILANTEROL TRIFENATATE 200; 25 UG/1; UG/1
200-25 POWDER RESPIRATORY (INHALATION)
Refills: 0 | Status: ACTIVE | COMMUNITY

## 2020-03-18 RX ORDER — BUDESONIDE AND FORMOTEROL FUMARATE DIHYDRATE 160; 4.5 UG/1; UG/1
160-4.5 AEROSOL RESPIRATORY (INHALATION) TWICE DAILY
Refills: 0 | Status: DISCONTINUED | COMMUNITY
End: 2020-03-18

## 2020-03-18 NOTE — HISTORY OF PRESENT ILLNESS
[Sexually Active] : The patient is sexually active [Condom Use] : using condoms [FreeTextEntry1] :  DX HIV 10/2014 - ROUTINE LABS = SEX CONTACT WAS POSITIVE\par ? 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 He is compliant with his medications and has not engaged in any mainly receptive risky behavior. He did mention he never received full dosing of HPV vaccination.\par \par Review of prior genotype shows no genetic mutations at all from 2014\par Since last visit patient has been compliant with BIKTARVY and the lab work is pending. Review of labs dating back to 2014 shows he has periodic lips but never more than 40.\par He is otherwise asymptomatic. He did not inquire with his insurance company regarding Gardasil\par  [Monogamous] : is not monogamous

## 2020-03-18 NOTE — ASSESSMENT
[FreeTextEntry1] : Patient seen for  evaluation of HIV disease. Prior labs have been reviewed and discussed with the patient.  Compliant with all medications. Viral load remains undetectable to a maximum of 30 and T-cell count remains above 500 The patient is up to date in vaccines in all issues surrounding their illness had been discussed and all questions answered.  Goals of therapy and issues of transmissibility as well as STD prevention were discussed in depth. Patient was conversant and all relevant questions were asked and answered .The patient has been instructed to followup here 6 months\par The whole concept of low-level viremia versus periodic blips were discussed with the patient in great detail. Review of genotype dating back 2014 and was repeated in 2016 shows no evidence of RT mutations specifically M1 84 v\par Patient is tolerating BIKTARVY so no adjustments need to be made specifically to dovato at this time\par \par He will followup in 6 months and consider Gardasil if covered

## 2020-04-02 ENCOUNTER — TRANSCRIPTION ENCOUNTER (OUTPATIENT)
Age: 30
End: 2020-04-02

## 2020-09-23 ENCOUNTER — LABORATORY RESULT (OUTPATIENT)
Age: 30
End: 2020-09-23

## 2020-10-06 ENCOUNTER — APPOINTMENT (OUTPATIENT)
Dept: INTERNAL MEDICINE | Facility: CLINIC | Age: 30
End: 2020-10-06
Payer: COMMERCIAL

## 2020-10-06 VITALS
BODY MASS INDEX: 30.94 KG/M2 | DIASTOLIC BLOOD PRESSURE: 70 MMHG | WEIGHT: 221 LBS | HEIGHT: 71 IN | TEMPERATURE: 97.9 F | SYSTOLIC BLOOD PRESSURE: 112 MMHG

## 2020-10-06 DIAGNOSIS — Z23 ENCOUNTER FOR IMMUNIZATION: ICD-10-CM

## 2020-10-06 DIAGNOSIS — Z86.39 PERSONAL HISTORY OF OTHER ENDOCRINE, NUTRITIONAL AND METABOLIC DISEASE: ICD-10-CM

## 2020-10-06 PROCEDURE — 90686 IIV4 VACC NO PRSV 0.5 ML IM: CPT

## 2020-10-06 PROCEDURE — 99214 OFFICE O/P EST MOD 30 MIN: CPT | Mod: 25

## 2020-10-06 PROCEDURE — G0008: CPT

## 2020-10-06 NOTE — ASSESSMENT
[FreeTextEntry1] : Patient seen for  evaluation of HIV disease. Prior labs have been reviewed and discussed with the patient.  Compliant with all medications. Viral load remains undetectable and T-cell count remains check at next The patient is up to date in vaccines in all issues surrounding their illness had been discussed and all questions answered.  Goals of therapy and issues of transmissibility as well as STD prevention were discussed in depth. Patient was conversant and all relevant questions were asked and answered .The patient has been instructed to followup here 6 months. \par \par Patient oversees also received influenza vaccination. Adnexa visit he will have STD screening labs hepatitis B levels and hep C although he is in a monogamous relationship. Viral load and T cells will also be checked. Patient is one of present compliant with medications and is aware of the need for medical adherence\par \par All issues regarding patient's health and medical problems have been discussed. The patient understands and concurs with the treatment plan. [Treatment Education] : treatment education [Treatment Adherence] : treatment adherence [Rx Dose / Side Effects] : Rx dose/side effects [Risk Reduction] : risk reduction [Drug Interactions / Side Effects] : drug interactions/side effects [Disclosure of Diagnosis] : disclosure of diagnosis [HIV Education] : HIV Education [Sexuality / Safer Sex] : sexuality/safer sex [Anticipatory Guidance] : anticipatory guidance

## 2020-10-06 NOTE — HISTORY OF PRESENT ILLNESS
[Sexually Active] : The patient is sexually active [Monogamous] : monogamous [Men] : with men [Male ___] : [unfilled] male [FreeTextEntry1] : Patient presents for routine evaluation of HIV disease. Has been compliant with  medication and has recently had blood. Denies any OI symptoms and is otherwise feeling well. There are no new complaints.\par \par Patient has long-standing HIV since age 24 and been compliant with medication. He has occasional plaques but is entirely suppressed on BIKTARVY. He is compliant with medications. He is in a monogamous relationship without exposure to any STDs since last visit. He is otherwise feeling well

## 2021-04-29 ENCOUNTER — LABORATORY RESULT (OUTPATIENT)
Age: 31
End: 2021-04-29

## 2021-06-01 ENCOUNTER — APPOINTMENT (OUTPATIENT)
Dept: INTERNAL MEDICINE | Facility: CLINIC | Age: 31
End: 2021-06-01

## 2021-06-08 ENCOUNTER — APPOINTMENT (OUTPATIENT)
Dept: INTERNAL MEDICINE | Facility: CLINIC | Age: 31
End: 2021-06-08
Payer: COMMERCIAL

## 2021-06-08 VITALS
DIASTOLIC BLOOD PRESSURE: 80 MMHG | WEIGHT: 230 LBS | SYSTOLIC BLOOD PRESSURE: 125 MMHG | HEIGHT: 71 IN | TEMPERATURE: 96.1 F | BODY MASS INDEX: 32.2 KG/M2

## 2021-06-08 DIAGNOSIS — Z20.2 CONTACT WITH AND (SUSPECTED) EXPOSURE TO INFECTIONS WITH A PREDOMINANTLY SEXUAL MODE OF TRANSMISSION: ICD-10-CM

## 2021-06-08 PROCEDURE — 99215 OFFICE O/P EST HI 40 MIN: CPT | Mod: 25

## 2021-06-08 PROCEDURE — 96372 THER/PROPH/DIAG INJ SC/IM: CPT

## 2021-06-08 NOTE — HISTORY OF PRESENT ILLNESS
[Sexually Active] : The patient is sexually active [Oral] : oral [Men] : with men [FreeTextEntry1] : Patient presents for routine evaluation of HIV disease. Has been compliant with  medication and has recently had blood. Denies any OI symptoms and is otherwise feeling well. There are no new complaints.\par \par Patient has long-standing HIV since age 24 and been compliant with medication. He has occasional blips but is entirely suppressed on BIKTARVY. He is compliant with medications. He is in a monogamous relationship without exposure to any STDs since last visit. He is otherwise feeling well\par [id number]\par Exposure to pt who states has syphillis in throat - oral lesion\par H/O syphilis in past [Monogamous] : is not monogamous

## 2021-06-08 NOTE — ASSESSMENT
[FreeTextEntry1] : Patient seen for  evaluation of HIV disease. Prior labs have been reviewed and discussed with the patient.  Compliant with all medications. Viral load remains undetectable and T-cell count remains stable the patient is up to date in vaccines in all issues surrounding their illness had been discussed and all questions answered.  Goals of therapy and issues of transmissibility as well as STD prevention were discussed in depth. Patient was conversant and all relevant questions were asked and answered .The patient has been instructed to followup here 6 months.  Hepatitis B antibody level borderline but should be protective and will defer reimmunization\par \par Patient also with repeat exposure to syphilis.  He performed oral sex in a patient who was found to have a lesion per the patient.  Patient's been treated in the past for syphilis this is within the last 2 weeks physical exam is unremarkable.  Patient did not have anal receptive intercourse with that patient.  Is my decision that he should be prophylactically treated for exposure to syphilis and he was given after consent 2,400,000 units of benzathine penicillin and HIV testing\par \par All issues regarding patient's health and medical problems have been discussed. The patient understands and concurs with the treatment plan.\par Again we had a long discussion about protected sexual contacts and avoidance of high risk behavior\par This was an extended visit lasting 45minutes, including review of prior notes laboratory data and examination and discussing with patient including completion of current note.\par \par  [Treatment Education] : treatment education [Treatment Adherence] : treatment adherence [Rx Dose / Side Effects] : Rx dose/side effects [Risk Reduction] : risk reduction [Drug Interactions / Side Effects] : drug interactions/side effects [Disclosure of Diagnosis] : disclosure of diagnosis [HIV Education] : HIV Education [Anticipatory Guidance] : anticipatory guidance

## 2021-06-08 NOTE — PHYSICAL EXAM
[General Appearance - Alert] : alert [General Appearance - In No Acute Distress] : in no acute distress [Sclera] : the sclera and conjunctiva were normal [Extraocular Movements] : extraocular movements were intact [PERRL With Normal Accommodation] : pupils were equal in size, round, reactive to light [Outer Ear] : the ears and nose were normal in appearance [Oropharynx] : the oropharynx was normal with no thrush [FreeTextEntry1] : no lesions [Neck Appearance] : the appearance of the neck was normal [Neck Cervical Mass (___cm)] : no neck mass was observed [Jugular Venous Distention Increased] : there was no jugular-venous distention [Thyroid Diffuse Enlargement] : the thyroid was not enlarged [Auscultation Breath Sounds / Voice Sounds] : lungs were clear to auscultation bilaterally [Heart Rate And Rhythm] : heart rate was normal and rhythm regular [Heart Sounds] : normal S1 and S2 [Heart Sounds Gallop] : no gallops [Murmurs] : no murmurs [Heart Sounds Pericardial Friction Rub] : no pericardial rub [Full Pulse] : the pedal pulses are present [Edema] : there was no peripheral edema [Bowel Sounds] : normal bowel sounds [Abdomen Soft] : soft [Abdomen Tenderness] : non-tender [Abdomen Mass (___ Cm)] : no abdominal mass palpated [Costovertebral Angle Tenderness] : no CVA tenderness [Penis Abnormality] : the penis was normal [Scrotum] : the scrotum was normal [Testes Swelling] : the testicles were not swollen [Testes Mass (___cm)] : there were no testicular masses [No Penile Discharge] : no penile discharge [No Palpable Adenopathy] : no palpable adenopathy [Musculoskeletal - Swelling] : no joint swelling [Nail Clubbing] : no clubbing  or cyanosis of the fingernails [Motor Tone] : muscle strength and tone were normal [Skin Color & Pigmentation] : normal skin color and pigmentation [] : no rash [Deep Tendon Reflexes (DTR)] : deep tendon reflexes were 2+ and symmetric [Sensation] : the sensory exam was normal to light touch and pinprick [No Focal Deficits] : no focal deficits

## 2021-06-09 ENCOUNTER — TRANSCRIPTION ENCOUNTER (OUTPATIENT)
Age: 31
End: 2021-06-09

## 2021-06-09 LAB — RPR SER-TITR: ABNORMAL

## 2021-06-11 ENCOUNTER — RX CHANGE (OUTPATIENT)
Age: 31
End: 2021-06-11

## 2021-12-05 LAB
CD3 CELLS # BLD: 1580 /UL
CD3 CELLS NFR BLD: 75 %
CD3+CD4+ CELLS # BLD: 766 /UL
CD3+CD4+ CELLS NFR BLD: 36 %
CD3+CD4+ CELLS/CD3+CD8+ CLL SPEC: 0.98 RATIO
CD3+CD8+ CELLS # SPEC: 778 /UL
CD3+CD8+ CELLS NFR BLD: 37 %
HIV1 RNA # SERPL NAA+PROBE: 331
HIV1 RNA # SERPL NAA+PROBE: ABNORMAL
VIRAL LOAD INTERP: NORMAL
VIRAL LOAD LOG: 2.52

## 2021-12-07 ENCOUNTER — APPOINTMENT (OUTPATIENT)
Dept: INTERNAL MEDICINE | Facility: CLINIC | Age: 31
End: 2021-12-07

## 2021-12-30 ENCOUNTER — APPOINTMENT (OUTPATIENT)
Dept: INTERNAL MEDICINE | Facility: CLINIC | Age: 31
End: 2021-12-30
Payer: COMMERCIAL

## 2021-12-30 VITALS
WEIGHT: 220 LBS | BODY MASS INDEX: 30.8 KG/M2 | SYSTOLIC BLOOD PRESSURE: 130 MMHG | TEMPERATURE: 98.1 F | HEIGHT: 71 IN | DIASTOLIC BLOOD PRESSURE: 80 MMHG

## 2021-12-30 DIAGNOSIS — J45.909 UNSPECIFIED ASTHMA, UNCOMPLICATED: ICD-10-CM

## 2021-12-30 DIAGNOSIS — Z86.16 PERSONAL HISTORY OF COVID-19: ICD-10-CM

## 2021-12-30 PROCEDURE — 99215 OFFICE O/P EST HI 40 MIN: CPT

## 2021-12-30 NOTE — ASSESSMENT
[FreeTextEntry1] : Patient seen for  evaluation of HIV disease. Prior labs have been reviewed and discussed with the patient.  Compliant with all medications. Viral load remains blip to 331 which was the first time in many years probably due to noncompliance and T-cell count remains adequate the patient is up to date in vaccines in all issues surrounding their illness had been discussed and all questions answered.  Goals of therapy and issues of transmissibility as well as STD prevention were discussed in depth. Patient was conversant and all relevant questions were asked and answered .The patient has been instructed to followup here 4 months\par Compliance was discussed at great length with patient and he will take the medicine on a daily basis.  Repeat viral load will be done in 4 weeks and if positive which is unlikely patient will need genotype performed.  We did discuss the concern for drug resistance which is extremely rare Biktarvy but he is aware that it may occur if he takes his meds intermittently\par Patient will also have his bloods repeated at next visit 4 months\par Because of his asthma and recent Covid he will call if he has another episode of Covid as he will need drug therapy.  He was also referred to pulmonologist\par All issues regarding patient's health and medical problems have been discussed. The patient understands and concurs with the treatment plan.\par This was an extended visit lasting 45 minutes, including review of prior notes laboratory data and examination and discussing with patient including completion of current note.\par \par

## 2021-12-30 NOTE — HISTORY OF PRESENT ILLNESS
[FreeTextEntry1] : Patient presents for routine evaluation of HIV disease. Has been compliant with  medication and has recently had blood. Denies any OI symptoms and is otherwise feeling well. There are no new complaints.\par \par Patient has long-standing HIV since age 24 and been compliant with medication. He has occasional blips but is entirely suppressed on BIKTARVY. He is compliant with medications. He is in a monogamous relationship without exposure to any STDs since last visit. He is otherwise feeling well\par [id number]\par Exposure to pt who states has syphillis in throat - oral lesion\par H/O syphilis in past\par \par [id number]\par Patient is here for routine follow-up.  Since last visit he had Covid approximately 4 weeks ago.  He never received his booster vaccination and will do so in 90 days.  Unfortunately patient is not 100% compliant with medication and has recently ran out and previously has missed several days.  His labs reflect that.\par Patient also has underlying asthma not currently seeing a pulmonologist

## 2021-12-30 NOTE — PHYSICAL EXAM
[General Appearance - Alert] : alert [General Appearance - In No Acute Distress] : in no acute distress [Sclera] : the sclera and conjunctiva were normal [PERRL With Normal Accommodation] : pupils were equal in size, round, reactive to light [Extraocular Movements] : extraocular movements were intact [Outer Ear] : the ears and nose were normal in appearance [Oropharynx] : the oropharynx was normal with no thrush [FreeTextEntry1] : no lesions [Neck Appearance] : the appearance of the neck was normal [Neck Cervical Mass (___cm)] : no neck mass was observed [Jugular Venous Distention Increased] : there was no jugular-venous distention [Thyroid Diffuse Enlargement] : the thyroid was not enlarged [Auscultation Breath Sounds / Voice Sounds] : lungs were clear to auscultation bilaterally [Heart Rate And Rhythm] : heart rate was normal and rhythm regular [Heart Sounds] : normal S1 and S2 [Heart Sounds Gallop] : no gallops [Murmurs] : no murmurs [Heart Sounds Pericardial Friction Rub] : no pericardial rub [Full Pulse] : the pedal pulses are present [Edema] : there was no peripheral edema [Bowel Sounds] : normal bowel sounds [Abdomen Soft] : soft [Abdomen Tenderness] : non-tender [Abdomen Mass (___ Cm)] : no abdominal mass palpated [Costovertebral Angle Tenderness] : no CVA tenderness [Penis Abnormality] : the penis was normal [Scrotum] : the scrotum was normal [Testes Mass (___cm)] : there were no testicular masses [Testes Swelling] : the testicles were not swollen [No Penile Discharge] : no penile discharge [No Palpable Adenopathy] : no palpable adenopathy [Musculoskeletal - Swelling] : no joint swelling [Nail Clubbing] : no clubbing  or cyanosis of the fingernails [Motor Tone] : muscle strength and tone were normal [Skin Color & Pigmentation] : normal skin color and pigmentation [] : no rash [Deep Tendon Reflexes (DTR)] : deep tendon reflexes were 2+ and symmetric [Sensation] : the sensory exam was normal to light touch and pinprick [No Focal Deficits] : no focal deficits

## 2022-04-05 ENCOUNTER — APPOINTMENT (OUTPATIENT)
Dept: INTERNAL MEDICINE | Facility: CLINIC | Age: 32
End: 2022-04-05

## 2022-07-07 ENCOUNTER — NON-APPOINTMENT (OUTPATIENT)
Age: 32
End: 2022-07-07

## 2022-08-23 ENCOUNTER — NON-APPOINTMENT (OUTPATIENT)
Age: 32
End: 2022-08-23

## 2022-09-26 ENCOUNTER — RX RENEWAL (OUTPATIENT)
Age: 32
End: 2022-09-26

## 2022-09-27 NOTE — HISTORY OF PRESENT ILLNESS
[FreeTextEntry1] : Patient presents for routine evaluation of HIV disease. Has been compliant with  medication and has recently had blood. Denies any OI symptoms and is otherwise feeling well. There are no new complaints.\par \par Patient has long-standing HIV since age 24 and been compliant with medication. He has occasional blips but is entirely suppressed on BIKTARVY. He is compliant with medications. He is in a monogamous relationship without exposure to any STDs since last visit. He is otherwise feeling well\par [id number]\par Exposure to pt who states has syphillis in throat - oral lesion\par H/O syphilis in past\par \par [id number]\par Patient is here for routine follow-up.  Since last visit he had Covid approximately 4 weeks ago.  He never received his booster vaccination and will do so in 90 days.  Unfortunately patient is not 100% compliant with medication and has recently ran out and previously has missed several days.  His labs reflect that.\par Patient also has underlying asthma not currently seeing a pulmonologist\par [id number]\par Patient presents for routine evaluation of HIV disease. Has been compliant with  medication and has recently had blood. Denies any OI symptoms and is otherwise feeling well. There are no new complaints.\par

## 2022-09-27 NOTE — HISTORY OF PRESENT ILLNESS
[FreeTextEntry1] : Patient presents for routine evaluation of HIV disease. Has been compliant with  medication and has recently had blood. Denies any OI symptoms and is otherwise feeling well. There are no new complaints.\par \par Patient has long-standing HIV since age 24 and been compliant with medication. He has occasional blips but is entirely suppressed on BIKTARVY. He is compliant with medications. He is in a monogamous relationship without exposure to any STDs since last visit. He is otherwise feeling well\par [id number]\par Exposure to pt who states has syphillis in throat - oral lesion\par H/O syphilis in past\par \par [id number]\par Patient is here for routine follow-up.  Since last visit he had Covid approximately 4 weeks ago.  He never received his booster vaccination and will do so in 90 days.  Unfortunately patient is not 100% compliant with medication and has recently ran out and previously has missed several days.  His labs reflect that.\par Patient also has underlying asthma not currently seeing a pulmonologist\par [id number]\par Patient presents for routine evaluation of HIV disease. Has been compliant with  medication and has recently had blood. Denies any OI symptoms and is otherwise feeling well. There are no new complaints.\par

## 2022-09-28 ENCOUNTER — APPOINTMENT (OUTPATIENT)
Dept: INTERNAL MEDICINE | Facility: CLINIC | Age: 32
End: 2022-09-28

## 2022-09-28 ENCOUNTER — TRANSCRIPTION ENCOUNTER (OUTPATIENT)
Age: 32
End: 2022-09-28

## 2022-12-28 ENCOUNTER — RX RENEWAL (OUTPATIENT)
Age: 32
End: 2022-12-28

## 2023-03-06 ENCOUNTER — RX RENEWAL (OUTPATIENT)
Age: 33
End: 2023-03-06

## 2023-03-07 ENCOUNTER — LABORATORY RESULT (OUTPATIENT)
Age: 33
End: 2023-03-07

## 2023-03-07 ENCOUNTER — NON-APPOINTMENT (OUTPATIENT)
Age: 33
End: 2023-03-07

## 2023-03-07 ENCOUNTER — APPOINTMENT (OUTPATIENT)
Dept: INTERNAL MEDICINE | Facility: CLINIC | Age: 33
End: 2023-03-07
Payer: COMMERCIAL

## 2023-03-07 VITALS
WEIGHT: 253.38 LBS | DIASTOLIC BLOOD PRESSURE: 74 MMHG | HEIGHT: 71 IN | BODY MASS INDEX: 35.47 KG/M2 | SYSTOLIC BLOOD PRESSURE: 128 MMHG

## 2023-03-07 PROCEDURE — 99215 OFFICE O/P EST HI 40 MIN: CPT | Mod: 25

## 2023-03-07 NOTE — PHYSICAL EXAM
[General Appearance - In No Acute Distress] : in no acute distress [General Appearance - Alert] : alert [Sclera] : the sclera and conjunctiva were normal [PERRL With Normal Accommodation] : pupils were equal in size, round, reactive to light [Extraocular Movements] : extraocular movements were intact [Outer Ear] : the ears and nose were normal in appearance [Oropharynx] : the oropharynx was normal with no thrush [Neck Appearance] : the appearance of the neck was normal [Neck Cervical Mass (___cm)] : no neck mass was observed [Thyroid Diffuse Enlargement] : the thyroid was not enlarged [Jugular Venous Distention Increased] : there was no jugular-venous distention [Auscultation Breath Sounds / Voice Sounds] : lungs were clear to auscultation bilaterally [Heart Rate And Rhythm] : heart rate was normal and rhythm regular [Heart Sounds] : normal S1 and S2 [Heart Sounds Gallop] : no gallops [Murmurs] : no murmurs [Heart Sounds Pericardial Friction Rub] : no pericardial rub [Full Pulse] : the pedal pulses are present [Edema] : there was no peripheral edema [Bowel Sounds] : normal bowel sounds [Abdomen Soft] : soft [Abdomen Tenderness] : non-tender [Abdomen Mass (___ Cm)] : no abdominal mass palpated [Costovertebral Angle Tenderness] : no CVA tenderness [Penis Abnormality] : the penis was normal [Scrotum] : the scrotum was normal [Testes Swelling] : the testicles were not swollen [Testes Mass (___cm)] : there were no testicular masses [No Penile Discharge] : no penile discharge [No Palpable Adenopathy] : no palpable adenopathy [Musculoskeletal - Swelling] : no joint swelling [Nail Clubbing] : no clubbing  or cyanosis of the fingernails [Motor Tone] : muscle strength and tone were normal [Skin Color & Pigmentation] : normal skin color and pigmentation [] : no rash [Deep Tendon Reflexes (DTR)] : deep tendon reflexes were 2+ and symmetric [Sensation] : the sensory exam was normal to light touch and pinprick [No Focal Deficits] : no focal deficits [FreeTextEntry1] : no lesions

## 2023-03-07 NOTE — ASSESSMENT
[FreeTextEntry1] : Patient seen for  evaluation of HIV disease. Prior labs have been reviewed and discussed with the patient.  Compliant with all medications. Viral load remains was 350 ryan  now pending and will and T-cell count remains pending the patient is up to date in vaccines in all issues surrounding their illness had been discussed and all questions answered.  Goals of therapy and issues of transmissibility as well as STD prevention were discussed in depth. Patient was conversant and all relevant questions were asked and answered .The patient has been instructed to followup here 4 months.  Compliance with medication was stressed.  I am also concerned about patient's excessive weight gain which has been 50 pounds since 2018.  Patient had routine screening labs for A1c and lipids and was referred to  In this office for further medical care.  All issues regarding patient's health and medical problems have been discussed. The patient understands and concurs with the treatment plan.

## 2023-03-07 NOTE — HISTORY OF PRESENT ILLNESS
[Sexually Active] : The patient is sexually active [Monogamous] : monogamous [Oral] : oral [Anal] : anal [Men] : with men [FreeTextEntry1] : Last seen 12/30/2021\par Patient presents for routine evaluation of HIV disease. Has been compliant with  medication and has recently had blood. Denies any OI symptoms and is otherwise feeling well. There are no new complaints.\par \par 03/06/2023\par This is first visit in 14 months for 32-year-old cuevas male with HIV.  It had been suppressed in the past but on his last visit he had a viral blip but was taking the medicines infrequently.  He did not return for follow-up appointment and comes in now for evaluation.  He is in a monogamous relationship for the past year his partner is HIV negative not on PrEP.  Patient has no complaints of any STDs and is anal surgery.  He is up-to-date with all vaccines and feels well.  He states he is 100% compliant with medication but unfortunately has gained a lot of weight in the past year due to poor dietary habits\par  [Condom Use] : not using condoms [de-identified] : hiv negative [de-identified] : anal insertive

## 2023-03-08 ENCOUNTER — TRANSCRIPTION ENCOUNTER (OUTPATIENT)
Age: 33
End: 2023-03-08

## 2023-03-08 LAB
ALBUMIN SERPL ELPH-MCNC: 4.7 G/DL
ALP BLD-CCNC: 80 U/L
ALT SERPL-CCNC: 102 U/L
ANION GAP SERPL CALC-SCNC: 13 MMOL/L
AST SERPL-CCNC: 52 U/L
BASOPHILS # BLD AUTO: 0.12 K/UL
BASOPHILS NFR BLD AUTO: 1.8 %
BILIRUB SERPL-MCNC: 0.7 MG/DL
BUN SERPL-MCNC: 13 MG/DL
CALCIUM SERPL-MCNC: 9.7 MG/DL
CD3 CELLS # BLD: 1698 CELLS/UL
CD3 CELLS NFR BLD: 73 %
CD3+CD4+ CELLS # BLD: 877 CELLS/UL
CD3+CD4+ CELLS NFR BLD: 38 %
CD3+CD4+ CELLS/CD3+CD8+ CLL SPEC: 1.09 RATIO
CD3+CD8+ CELLS # SPEC: 807 CELLS/UL
CD3+CD8+ CELLS NFR BLD: 35 %
CHLORIDE SERPL-SCNC: 102 MMOL/L
CHOLEST SERPL-MCNC: 186 MG/DL
CO2 SERPL-SCNC: 23 MMOL/L
CREAT SERPL-MCNC: 1.24 MG/DL
EGFR: 79 ML/MIN/1.73M2
EOSINOPHIL # BLD AUTO: 0.64 K/UL
EOSINOPHIL NFR BLD AUTO: 9.4 %
ESTIMATED AVERAGE GLUCOSE: 117 MG/DL
GLUCOSE SERPL-MCNC: 99 MG/DL
HBA1C MFR BLD HPLC: 5.7 %
HCT VFR BLD CALC: 45.4 %
HDLC SERPL-MCNC: 60 MG/DL
HGB BLD-MCNC: 15 G/DL
HIV1 RNA # SERPL NAA+PROBE: ABNORMAL
HIV1 RNA # SERPL NAA+PROBE: ABNORMAL COPIES/ML
IMM GRANULOCYTES NFR BLD AUTO: 0.3 %
LDLC SERPL CALC-MCNC: 105 MG/DL
LYMPHOCYTES # BLD AUTO: 2.46 K/UL
LYMPHOCYTES NFR BLD AUTO: 36.2 %
MAN DIFF?: NORMAL
MCHC RBC-ENTMCNC: 31.9 PG
MCHC RBC-ENTMCNC: 33 GM/DL
MCV RBC AUTO: 96.6 FL
MONOCYTES # BLD AUTO: 0.59 K/UL
MONOCYTES NFR BLD AUTO: 8.7 %
NEUTROPHILS # BLD AUTO: 2.96 K/UL
NEUTROPHILS NFR BLD AUTO: 43.6 %
NONHDLC SERPL-MCNC: 126 MG/DL
PLATELET # BLD AUTO: 321 K/UL
POTASSIUM SERPL-SCNC: 4.4 MMOL/L
PROT SERPL-MCNC: 7.9 G/DL
RBC # BLD: 4.7 M/UL
RBC # FLD: 11.9 %
SODIUM SERPL-SCNC: 138 MMOL/L
T PALLIDUM AB SER QL IA: POSITIVE
TRIGL SERPL-MCNC: 102 MG/DL
VIRAL LOAD INTERP: NORMAL
VIRAL LOAD LOG: ABNORMAL LG COP/ML
WBC # FLD AUTO: 6.79 K/UL

## 2023-03-29 LAB
HIV GENOSURE ARCHIVE 1: NORMAL
HIV1 PROVIR DNA RT + PR + IN MUT DET SEQ: NORMAL
HIV1 PROVIRAL DNA GENTYP BLD MC NAR: NORMAL

## 2023-04-19 ENCOUNTER — APPOINTMENT (OUTPATIENT)
Dept: INTERNAL MEDICINE | Facility: CLINIC | Age: 33
End: 2023-04-19

## 2023-07-12 ENCOUNTER — APPOINTMENT (OUTPATIENT)
Dept: INTERNAL MEDICINE | Facility: CLINIC | Age: 33
End: 2023-07-12

## 2023-07-12 ENCOUNTER — TRANSCRIPTION ENCOUNTER (OUTPATIENT)
Age: 33
End: 2023-07-12

## 2023-07-25 ENCOUNTER — RX RENEWAL (OUTPATIENT)
Age: 33
End: 2023-07-25

## 2023-10-02 ENCOUNTER — APPOINTMENT (OUTPATIENT)
Dept: INTERNAL MEDICINE | Facility: CLINIC | Age: 33
End: 2023-10-02
Payer: COMMERCIAL

## 2023-10-02 ENCOUNTER — LABORATORY RESULT (OUTPATIENT)
Age: 33
End: 2023-10-02

## 2023-10-02 VITALS
HEIGHT: 71 IN | DIASTOLIC BLOOD PRESSURE: 80 MMHG | BODY MASS INDEX: 33.18 KG/M2 | WEIGHT: 237 LBS | SYSTOLIC BLOOD PRESSURE: 140 MMHG

## 2023-10-02 PROCEDURE — 99204 OFFICE O/P NEW MOD 45 MIN: CPT | Mod: 25

## 2023-10-03 ENCOUNTER — TRANSCRIPTION ENCOUNTER (OUTPATIENT)
Age: 33
End: 2023-10-03

## 2023-10-03 LAB
HIV1 RNA # SERPL NAA+PROBE: NORMAL
HIV1 RNA # SERPL NAA+PROBE: NORMAL COPIES/ML
VIRAL LOAD INTERP: NORMAL
VIRAL LOAD LOG: NORMAL LG COP/ML

## 2023-10-04 ENCOUNTER — TRANSCRIPTION ENCOUNTER (OUTPATIENT)
Age: 33
End: 2023-10-04

## 2023-10-04 LAB — T PALLIDUM AB SER QL IA: POSITIVE

## 2023-10-16 ENCOUNTER — RX RENEWAL (OUTPATIENT)
Age: 33
End: 2023-10-16

## 2023-12-28 ENCOUNTER — TRANSCRIPTION ENCOUNTER (OUTPATIENT)
Age: 33
End: 2023-12-28

## 2024-05-03 ENCOUNTER — RX RENEWAL (OUTPATIENT)
Age: 34
End: 2024-05-03

## 2024-05-06 PROBLEM — Z51.81 ENCOUNTER FOR MEDICATION MONITORING: Status: ACTIVE | Noted: 2017-06-08

## 2024-05-06 RX ORDER — BICTEGRAVIR SODIUM, EMTRICITABINE, AND TENOFOVIR ALAFENAMIDE FUMARATE 50; 200; 25 MG/1; MG/1; MG/1
50-200-25 TABLET ORAL
Qty: 30 | Refills: 5 | Status: ACTIVE | COMMUNITY
Start: 2022-08-24 | End: 1900-01-01

## 2024-05-08 ENCOUNTER — APPOINTMENT (OUTPATIENT)
Dept: INTERNAL MEDICINE | Facility: CLINIC | Age: 34
End: 2024-05-08
Payer: COMMERCIAL

## 2024-05-08 DIAGNOSIS — Z51.81 ENCOUNTER FOR THERAPEUTIC DRUG LVL MONITORING: ICD-10-CM

## 2024-05-15 ENCOUNTER — APPOINTMENT (OUTPATIENT)
Dept: INTERNAL MEDICINE | Facility: CLINIC | Age: 34
End: 2024-05-15
Payer: COMMERCIAL

## 2024-05-15 ENCOUNTER — LABORATORY RESULT (OUTPATIENT)
Age: 34
End: 2024-05-15

## 2024-05-15 VITALS
DIASTOLIC BLOOD PRESSURE: 83 MMHG | SYSTOLIC BLOOD PRESSURE: 124 MMHG | WEIGHT: 274 LBS | HEIGHT: 71 IN | BODY MASS INDEX: 38.36 KG/M2

## 2024-05-15 DIAGNOSIS — Z70.8 OTHER SEX COUNSELING: ICD-10-CM

## 2024-05-15 DIAGNOSIS — Z80.0 FAMILY HISTORY OF MALIGNANT NEOPLASM OF DIGESTIVE ORGANS: ICD-10-CM

## 2024-05-15 DIAGNOSIS — Z09 ENCOUNTER FOR FOLLOW-UP EXAMINATION AFTER COMPLETED TREATMENT FOR CONDITIONS OTHER THAN MALIGNANT NEOPLASM: ICD-10-CM

## 2024-05-15 DIAGNOSIS — E66.9 OBESITY, UNSPECIFIED: ICD-10-CM

## 2024-05-15 PROCEDURE — 99215 OFFICE O/P EST HI 40 MIN: CPT

## 2024-05-15 NOTE — HISTORY OF PRESENT ILLNESS
[Sexually Active] : The patient is sexually active [Monogamous] : monogamous [Oral] : oral [Men] : with men [FreeTextEntry1] : Last seen 12/30/2021 Patient presents for routine evaluation of HIV disease. Has been compliant with  medication and has recently had blood. Denies any OI symptoms and is otherwise feeling well. There are no new complaints.  03/06/2023 This is first visit in 14 months for 32-year-old cuevas male with HIV.  It had been suppressed in the past but on his last visit he had a viral blip but was taking the medicines infrequently.  He did not return for follow-up appointment and comes in now for evaluation.  He is in a monogamous relationship for the past year his partner is HIV negative not on PrEP.  Patient has no complaints of any STDs and is anal surgery.  He is up-to-date with all vaccines and feels well.  He states he is 100% compliant with medication but unfortunately has gained a lot of weight in the past year due to poor dietary habits  10/02/23 Patient here in routine follow-up for HIV.  He is asymptomatic has not had any STDs.  He is in a monogamous relationship but has no penetrative sex.  He is asymptomatic on medication has no other  [id number] Patient presents for routine evaluation of HIV disease. Has been compliant with medication and is up to date with blood evaluation. Denies any OI symptoms and is otherwise feeling well. Any new complaints and issues will be discussed.  Patient remains in a monogamous relationship.  No evidence of history of STDs of late. Patient's sister who is 30 was recently diagnosed with colon cancer without any underlying diseases.  There is also a family history of diabetes and and patient is gained significant weight in the last several years.  He otherwise is asymptomatic  [Condom Use] : not using condoms [de-identified] : hiv negative [de-identified] : anal insertive

## 2024-05-15 NOTE — ASSESSMENT
[FreeTextEntry1] : Patient seen for  evaluation of HIV disease. Prior labs have been reviewed and discussed with the patient.  Compliant with all medications. Viral load remains has been undetectable the patient is up to date in vaccines in all issues surrounding their illness had been discussed and all questions answered.  Goals of therapy and issues of transmissibility as well as STD prevention were discussed in depth. Patient was conversant and all relevant questions were asked and answered .The patient has been instructed to followup here [4 months Patient without any new symptoms although it appears from excessive snoring that he has sleep apnea and referred to pulmonary.  In addition patient with significant weight gain over the last number of years and was referred to new internist at his request for follow-up. Patient has family history of colon cancer and referred to GI for evaluation.  Patient also with family history of diabetes and A1c checked and he will follow-up with internist regarding medication for weight loss and possible diabetes All issues regarding patient's health and medical problems have been discussed. The patient understands and concurs with the treatment plan.

## 2024-05-16 ENCOUNTER — TRANSCRIPTION ENCOUNTER (OUTPATIENT)
Age: 34
End: 2024-05-16

## 2024-05-16 LAB
ALBUMIN SERPL ELPH-MCNC: 4.5 G/DL
ALP BLD-CCNC: 91 U/L
ALT SERPL-CCNC: 75 U/L
ANION GAP SERPL CALC-SCNC: 13 MMOL/L
AST SERPL-CCNC: 43 U/L
BASOPHILS # BLD AUTO: 0.1 K/UL
BASOPHILS NFR BLD AUTO: 1.8 %
BILIRUB SERPL-MCNC: 0.4 MG/DL
BUN SERPL-MCNC: 14 MG/DL
CALCIUM SERPL-MCNC: 9.4 MG/DL
CD3 CELLS # BLD: 1560 CELLS/UL
CD3 CELLS NFR BLD: 73 %
CD3+CD4+ CELLS # BLD: 846 CELLS/UL
CD3+CD4+ CELLS NFR BLD: 40 %
CD3+CD4+ CELLS/CD3+CD8+ CLL SPEC: 1.22 RATIO
CD3+CD8+ CELLS # SPEC: 691 CELLS/UL
CD3+CD8+ CELLS NFR BLD: 33 %
CHLORIDE SERPL-SCNC: 104 MMOL/L
CO2 SERPL-SCNC: 24 MMOL/L
CREAT SERPL-MCNC: 1.32 MG/DL
EGFR: 72 ML/MIN/1.73M2
EOSINOPHIL # BLD AUTO: 0.51 K/UL
EOSINOPHIL NFR BLD AUTO: 9.2 %
ESTIMATED AVERAGE GLUCOSE: 120 MG/DL
GLUCOSE SERPL-MCNC: 110 MG/DL
HBA1C MFR BLD HPLC: 5.8 %
HBV SURFACE AB SERPL IA-ACNC: 8.7 MIU/ML
HCT VFR BLD CALC: 39.9 %
HCV AB SER QL: NONREACTIVE
HCV S/CO RATIO: 0.09 S/CO
HGB BLD-MCNC: 14 G/DL
HIV1 RNA # SERPL NAA+PROBE: NORMAL
HIV1 RNA # SERPL NAA+PROBE: NORMAL COPIES/ML
IMM GRANULOCYTES NFR BLD AUTO: 0.2 %
LYMPHOCYTES # BLD AUTO: 2.21 K/UL
LYMPHOCYTES NFR BLD AUTO: 39.7 %
MAN DIFF?: NORMAL
MCHC RBC-ENTMCNC: 31.8 PG
MCHC RBC-ENTMCNC: 35.1 GM/DL
MCV RBC AUTO: 90.7 FL
MONOCYTES # BLD AUTO: 0.55 K/UL
MONOCYTES NFR BLD AUTO: 9.9 %
NEUTROPHILS # BLD AUTO: 2.18 K/UL
NEUTROPHILS NFR BLD AUTO: 39.2 %
PLATELET # BLD AUTO: 297 K/UL
POTASSIUM SERPL-SCNC: 4.4 MMOL/L
PROT SERPL-MCNC: 7.1 G/DL
RBC # BLD: 4.4 M/UL
RBC # FLD: 12.1 %
SODIUM SERPL-SCNC: 142 MMOL/L
VIRAL LOAD INTERP: NORMAL
VIRAL LOAD LOG: NORMAL LG COP/ML
WBC # FLD AUTO: 5.56 K/UL

## 2024-05-17 LAB — T PALLIDUM AB SER QL IA: POSITIVE

## 2024-05-24 ENCOUNTER — APPOINTMENT (OUTPATIENT)
Dept: INTERNAL MEDICINE | Facility: CLINIC | Age: 34
End: 2024-05-24
Payer: COMMERCIAL

## 2024-05-24 VITALS
BODY MASS INDEX: 37.1 KG/M2 | WEIGHT: 265 LBS | OXYGEN SATURATION: 96 % | TEMPERATURE: 98 F | DIASTOLIC BLOOD PRESSURE: 90 MMHG | SYSTOLIC BLOOD PRESSURE: 144 MMHG | HEIGHT: 71 IN | HEART RATE: 95 BPM

## 2024-05-24 DIAGNOSIS — Z00.00 ENCOUNTER FOR GENERAL ADULT MEDICAL EXAMINATION W/OUT ABNORMAL FINDINGS: ICD-10-CM

## 2024-05-24 DIAGNOSIS — R29.818 OTHER SYMPTOMS AND SIGNS INVOLVING THE NERVOUS SYSTEM: ICD-10-CM

## 2024-05-24 DIAGNOSIS — B20 HUMAN IMMUNODEFICIENCY VIRUS [HIV] DISEASE: ICD-10-CM

## 2024-05-24 DIAGNOSIS — Z13.31 ENCOUNTER FOR SCREENING FOR DEPRESSION: ICD-10-CM

## 2024-05-24 DIAGNOSIS — Z23 ENCOUNTER FOR IMMUNIZATION: ICD-10-CM

## 2024-05-24 DIAGNOSIS — I10 ESSENTIAL (PRIMARY) HYPERTENSION: ICD-10-CM

## 2024-05-24 PROCEDURE — 99385 PREV VISIT NEW AGE 18-39: CPT | Mod: 25

## 2024-05-24 PROCEDURE — 90739 HEPB VACC 2/4 DOSE ADULT IM: CPT

## 2024-05-24 PROCEDURE — G0010: CPT

## 2024-05-24 RX ORDER — DOXYCYCLINE 100 MG/1
100 TABLET, FILM COATED ORAL
Qty: 20 | Refills: 0 | Status: DISCONTINUED | COMMUNITY
Start: 2023-10-02 | End: 2024-05-24

## 2024-05-24 RX ORDER — AMLODIPINE BESYLATE 5 MG/1
5 TABLET ORAL
Qty: 30 | Refills: 0 | Status: ACTIVE | COMMUNITY
Start: 2024-05-24 | End: 1900-01-01

## 2024-05-26 PROBLEM — Z13.31 POSITIVE SCREENING FOR DEPRESSION ON 9-ITEM PATIENT HEALTH QUESTIONNAIRE (PHQ-9): Status: ACTIVE | Noted: 2024-05-26

## 2024-05-26 PROBLEM — I10 PRIMARY HYPERTENSION: Status: ACTIVE | Noted: 2024-05-24

## 2024-05-26 PROBLEM — R29.818 SUSPECTED SLEEP APNEA: Status: ACTIVE | Noted: 2024-05-24

## 2024-05-26 PROBLEM — Z00.00 ANNUAL PHYSICAL EXAM: Status: ACTIVE | Noted: 2024-05-26

## 2024-05-26 RX ORDER — HEPATITIS B VACCINE (RECOMBINANT) ADJUVANTED 20 UG/.5ML
20 INJECTION, SOLUTION INTRAMUSCULAR
Qty: 2 | Refills: 0 | Status: DISCONTINUED | COMMUNITY
Start: 2024-05-20 | End: 2024-05-26

## 2024-05-26 NOTE — HEALTH RISK ASSESSMENT
[2 - 3 times a week (3 pts)] : 2 - 3  times a week (3 points) [3 or 4 (1 pt)] : 3 or 4  (1 point) [Never (0 pts)] : Never (0 points) [1] : 1) Little interest or pleasure doing things for several days (1) [2] : 2) Feeling down, depressed, or hopeless for more than half of the days (2) [PHQ-2 Positive] : PHQ-2 Positive [Several Days (1)] : 2.) Feeling down, depressed or hopeless? Several days [1/2 of Days or More (2)] : 7.) Trouble concentrating on things, such as reading a newspaper or watching television? Half the days or more [Nearly Every Day (3)] : 8.) Moving or speaking so slowly that other people could have noticed, or the opposite, moving or speaking faster than usual? Nearly every day [Not at All (0)] : 9.) Thoughts that you would be off dead or of hurting yourself in some way? Not at all [Never] : Never [# of Members in Household ___] :  household currently consist of [unfilled] member(s) [Employed] : employed [# Of Children ___] : has [unfilled] children [Fully functional (bathing, dressing, toileting, transferring, walking, feeding)] : Fully functional (bathing, dressing, toileting, transferring, walking, feeding) [Fully functional (using the telephone, shopping, preparing meals, housekeeping, doing laundry, using] : Fully functional and needs no help or supervision to perform IADLs (using the telephone, shopping, preparing meals, housekeeping, doing laundry, using transportation, managing medications and managing finances) [Smoke Detector] : smoke detector [Carbon Monoxide Detector] : carbon monoxide detector [Yes] : Yes [Audit-CScore] : 4 [de-identified] : active but no regular gym routine [de-identified] : Unrestricted Adult Diet [LMK5Tocqc] : 3 [UAL4SidijYdqbd] : 13 [HIVComments] : HIV positive X 9 yrs [HepatitisCDate] : 5/15/24 [HepatitisCComments] : Non reactive [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

## 2024-05-26 NOTE — HISTORY OF PRESENT ILLNESS
[de-identified] : AQUILINO SUNG III is a 34 year M who presents today to establish with [facility] @ [place]. His past medical history is significant for HIV +. He was previously getting his Primary Care from his infectious disease specialist. He is maintained on anti-viral therapy. He also has a history of RAD and is maintained on BREO inhaler for control of the condition. He also suffers from prediabetes, and obesity. He is not maintained on medication for these conditions.  Today, he scored positive on the STOP BANG questionnaire and agreed to home sleep study.

## 2024-05-26 NOTE — REVIEW OF SYSTEMS
[Patient Intake Form Reviewed] : Patient intake form was reviewed [Shortness Of Breath] : shortness of breath [Negative] : Heme/Lymph

## 2024-05-26 NOTE — PHYSICAL EXAM
[No Acute Distress] : no acute distress [Well Nourished] : well nourished [Well Developed] : well developed [Well-Appearing] : well-appearing [Normal Sclera/Conjunctiva] : normal sclera/conjunctiva [PERRL] : pupils equal round and reactive to light [EOMI] : extraocular movements intact [Normal Outer Ear/Nose] : the outer ears and nose were normal in appearance [Normal Oropharynx] : the oropharynx was normal [No JVD] : no jugular venous distention [No Lymphadenopathy] : no lymphadenopathy [Supple] : supple [Thyroid Normal, No Nodules] : the thyroid was normal and there were no nodules present [No Respiratory Distress] : no respiratory distress  [No Accessory Muscle Use] : no accessory muscle use [Clear to Auscultation] : lungs were clear to auscultation bilaterally [Normal Rate] : normal rate  [Regular Rhythm] : with a regular rhythm [Normal S1, S2] : normal S1 and S2 [No Murmur] : no murmur heard [No Abdominal Bruit] : a ~M bruit was not heard ~T in the abdomen [No Edema] : there was no peripheral edema [No Palpable Aorta] : no palpable aorta [No Extremity Clubbing/Cyanosis] : no extremity clubbing/cyanosis [Soft] : abdomen soft [Non Tender] : non-tender [Non-distended] : non-distended [No HSM] : no HSM [Normal Bowel Sounds] : normal bowel sounds [Normal Supraclavicular Nodes] : no supraclavicular lymphadenopathy [Normal Anterior Cervical Nodes] : no anterior cervical lymphadenopathy [No CVA Tenderness] : no CVA  tenderness [No Spinal Tenderness] : no spinal tenderness [No Joint Swelling] : no joint swelling [Grossly Normal Strength/Tone] : grossly normal strength/tone [No Rash] : no rash [Coordination Grossly Intact] : coordination grossly intact [No Focal Deficits] : no focal deficits [Normal Gait] : normal gait [Speech Grossly Normal] : speech grossly normal [Deep Tendon Reflexes (DTR)] : deep tendon reflexes were 2+ and symmetric [Normal Affect] : the affect was normal [Normal Mood] : the mood was normal [Normal Insight/Judgement] : insight and judgment were intact

## 2024-06-24 ENCOUNTER — APPOINTMENT (OUTPATIENT)
Dept: INTERNAL MEDICINE | Facility: CLINIC | Age: 34
End: 2024-06-24

## 2024-09-18 ENCOUNTER — LABORATORY RESULT (OUTPATIENT)
Age: 34
End: 2024-09-18

## 2024-09-18 ENCOUNTER — APPOINTMENT (OUTPATIENT)
Dept: INFECTIOUS DISEASE | Facility: CLINIC | Age: 34
End: 2024-09-18
Payer: COMMERCIAL

## 2024-09-18 VITALS
DIASTOLIC BLOOD PRESSURE: 70 MMHG | HEIGHT: 71 IN | WEIGHT: 265 LBS | SYSTOLIC BLOOD PRESSURE: 120 MMHG | BODY MASS INDEX: 37.1 KG/M2

## 2024-09-18 DIAGNOSIS — Z70.8 OTHER SEX COUNSELING: ICD-10-CM

## 2024-09-18 DIAGNOSIS — Z21 ASYMPTOMATIC HUMAN IMMUNODEFICIENCY VIRUS [HIV] INFECTION STATUS: ICD-10-CM

## 2024-09-18 DIAGNOSIS — I10 ESSENTIAL (PRIMARY) HYPERTENSION: ICD-10-CM

## 2024-09-18 DIAGNOSIS — Z23 ENCOUNTER FOR IMMUNIZATION: ICD-10-CM

## 2024-09-18 PROCEDURE — 99215 OFFICE O/P EST HI 40 MIN: CPT | Mod: 25

## 2024-09-18 PROCEDURE — 90739 HEPB VACC 2/4 DOSE ADULT IM: CPT

## 2024-09-18 PROCEDURE — G0010: CPT

## 2024-09-18 RX ORDER — DOXYCYCLINE HYCLATE 100 MG/1
100 CAPSULE ORAL DAILY
Qty: 30 | Refills: 2 | Status: ACTIVE | COMMUNITY
Start: 2024-09-18 | End: 1900-01-01

## 2024-09-18 RX ORDER — SEMAGLUTIDE 0.68 MG/ML
2 INJECTION, SOLUTION SUBCUTANEOUS
Refills: 0 | Status: ACTIVE | COMMUNITY

## 2024-09-18 NOTE — ASSESSMENT
[FreeTextEntry1] : He is down to he is lost 40 pounds since I last saw I put him on Ozempic but he cannot himself on Ozempic I changed weight is 229 patient seen for  evaluation of HIV disease. Prior labs have been reviewed and discussed with the patient.  Compliant with all medications. Viral load remains undetectable with repeat pending the patient is up to date in vaccines in all issues surrounding their illness had been discussed and all questions answered.  Goals of therapy and issues of transmissibility as well as STD prevention were discussed in depth. Patient was conversant and all relevant questions were asked and answered .The patient has been instructed to followup here patient received Heplisav-B x 1 and will get second dose today.  Patient had full screening labs Results of current evaluation discussed with patient. Medications were reviewed and all relevant labs as well as a 1C level and glucose levels were discussed in depth with  patient. Further options for ideal control were discussed, long-term complications of diabetes and screening for complications were also discussed. Patient was conversant and all relevant questions were asked and answered.  Patient was prediabetic at last visit 5.8 gets Ozempic elsewhere weight is down almost 40 pounds assume A1c will be normalized Patient with mild elevation with function test concern for SOSA we will repeat Patient referred to GI for colonoscopy with family history.  All issues regarding patient's health and medical problems have been discussed. The patient understands and concurs with the treatment plan.

## 2024-09-18 NOTE — HISTORY OF PRESENT ILLNESS
[Sexually Active] : The patient is sexually active [Monogamous] : monogamous [Oral] : oral [Men] : with men [Male ___] : [unfilled] male [FreeTextEntry1] : Last seen 12/30/2021 Patient presents for routine evaluation of HIV disease. Has been compliant with  medication and has recently had blood. Denies any OI symptoms and is otherwise feeling well. There are no new complaints.  03/06/2023 This is first visit in 14 months for 32-year-old cuevas male with HIV.  It had been suppressed in the past but on his last visit he had a viral blip but was taking the medicines infrequently.  He did not return for follow-up appointment and comes in now for evaluation.  He is in a monogamous relationship for the past year his partner is HIV negative not on PrEP.  Patient has no complaints of any STDs and is anal surgery.  He is up-to-date with all vaccines and feels well.  He states he is 100% compliant with medication but unfortunately has gained a lot of weight in the past year due to poor dietary habits  10/02/23 Patient here in routine follow-up for HIV.  He is asymptomatic has not had any STDs.  He is in a monogamous relationship but has no penetrative sex.  He is asymptomatic on medication has no other  [id number] Patient presents for routine evaluation of HIV disease. Has been compliant with medication and is up to date with blood evaluation. Denies any OI symptoms and is otherwise feeling well. Any new complaints and issues will be discussed.  Patient remains in a monogamous relationship.  No evidence of history of STDs of late. Patient's sister who is 30 was recently diagnosed with colon cancer without any underlying diseases.  There is also a family history of diabetes and and patient is gained significant weight in the last several years.  He otherwise is asymptomatic  09/17/2024 Patient presents for routine evaluation of HIV disease. Has been compliant with medication and is up to date with blood evaluation. Denies any OI symptoms and is otherwise feeling well. Any new complaints and issues will be discussed. Ozempic for weight loss - 30 pounds no colon yet Patient remains in open manage with oral sex by him and his partner outside of their marriage and no other he is otherwise asymptomatic   [Condom Use] : not using condoms [de-identified] : hiv negative [de-identified] : anal insertive random hooks

## 2024-09-19 ENCOUNTER — TRANSCRIPTION ENCOUNTER (OUTPATIENT)
Age: 34
End: 2024-09-19

## 2024-09-19 ENCOUNTER — NON-APPOINTMENT (OUTPATIENT)
Age: 34
End: 2024-09-19

## 2024-09-19 DIAGNOSIS — R79.89 OTHER SPECIFIED ABNORMAL FINDINGS OF BLOOD CHEMISTRY: ICD-10-CM

## 2024-09-19 LAB
ALBUMIN SERPL ELPH-MCNC: 4.8 G/DL
ALP BLD-CCNC: 102 U/L
ALT SERPL-CCNC: 118 U/L
ANION GAP SERPL CALC-SCNC: 12 MMOL/L
AST SERPL-CCNC: 49 U/L
BILIRUB SERPL-MCNC: 0.4 MG/DL
BUN SERPL-MCNC: 13 MG/DL
CALCIUM SERPL-MCNC: 9.6 MG/DL
CD3 CELLS # BLD: 1603 CELLS/UL
CD3 CELLS NFR BLD: 71 %
CD3+CD4+ CELLS # BLD: 877 CELLS/UL
CD3+CD4+ CELLS NFR BLD: 39 %
CD3+CD4+ CELLS/CD3+CD8+ CLL SPEC: 1.24 RATIO
CD3+CD8+ CELLS # SPEC: 709 CELLS/UL
CD3+CD8+ CELLS NFR BLD: 32 %
CHLORIDE SERPL-SCNC: 104 MMOL/L
CO2 SERPL-SCNC: 25 MMOL/L
CREAT SERPL-MCNC: 1.13 MG/DL
EGFR: 87 ML/MIN/1.73M2
ESTIMATED AVERAGE GLUCOSE: 105 MG/DL
GLUCOSE SERPL-MCNC: 97 MG/DL
HBA1C MFR BLD HPLC: 5.3 %
HCV AB SER QL: NONREACTIVE
HCV S/CO RATIO: 0.09 S/CO
POTASSIUM SERPL-SCNC: 4 MMOL/L
PROT SERPL-MCNC: 7.3 G/DL
SODIUM SERPL-SCNC: 141 MMOL/L

## 2024-09-22 LAB — T PALLIDUM AB SER QL IA: POSITIVE

## 2024-09-24 ENCOUNTER — APPOINTMENT (OUTPATIENT)
Dept: GASTROENTEROLOGY | Facility: CLINIC | Age: 34
End: 2024-09-24

## 2024-12-10 ENCOUNTER — NON-APPOINTMENT (OUTPATIENT)
Age: 34
End: 2024-12-10

## 2024-12-10 NOTE — REVIEW OF SYSTEMS
on the discharge service for the patient. I have reviewed and made amendments to the documentation where necessary. [Negative] : Heme/Lymph

## 2024-12-11 ENCOUNTER — TRANSCRIPTION ENCOUNTER (OUTPATIENT)
Age: 34
End: 2024-12-11

## 2025-01-29 ENCOUNTER — LABORATORY RESULT (OUTPATIENT)
Age: 35
End: 2025-01-29

## 2025-01-29 ENCOUNTER — APPOINTMENT (OUTPATIENT)
Dept: INFECTIOUS DISEASE | Facility: CLINIC | Age: 35
End: 2025-01-29
Payer: COMMERCIAL

## 2025-01-29 VITALS
SYSTOLIC BLOOD PRESSURE: 154 MMHG | WEIGHT: 228 LBS | DIASTOLIC BLOOD PRESSURE: 95 MMHG | OXYGEN SATURATION: 94 % | HEIGHT: 71 IN | BODY MASS INDEX: 31.92 KG/M2 | HEART RATE: 93 BPM

## 2025-01-29 DIAGNOSIS — Z70.8 OTHER SEX COUNSELING: ICD-10-CM

## 2025-01-29 DIAGNOSIS — Z21 ASYMPTOMATIC HUMAN IMMUNODEFICIENCY VIRUS [HIV] INFECTION STATUS: ICD-10-CM

## 2025-01-29 DIAGNOSIS — Z71.7 HUMAN IMMUNODEFICIENCY VIRUS [HIV] COUNSELING: ICD-10-CM

## 2025-01-29 PROCEDURE — 90739 HEPB VACC 2/4 DOSE ADULT IM: CPT

## 2025-01-29 PROCEDURE — G0010: CPT

## 2025-01-29 PROCEDURE — 99214 OFFICE O/P EST MOD 30 MIN: CPT | Mod: 25

## 2025-01-30 ENCOUNTER — TRANSCRIPTION ENCOUNTER (OUTPATIENT)
Age: 35
End: 2025-01-30

## 2025-01-31 LAB
C TRACH RRNA SPEC QL NAA+PROBE: NOT DETECTED
HCV AB SER QL: NONREACTIVE
HCV S/CO RATIO: 0.09 S/CO
N GONORRHOEA RRNA SPEC QL NAA+PROBE: NOT DETECTED
SOURCE AMPLIFICATION: NORMAL
T PALLIDUM AB SER QL IA: POSITIVE

## 2025-02-25 NOTE — DATA REVIEWED
[FreeTextEntry1] : All records from [facility] and laboratory data were reviewed. His viral load is undetectable his T-cell count needs to be rechecked. His RPR is 1:1 indicative of old syphilis. He is hepatitis B surface antibody positive, hepatitisA IGg she positive and hep C negative\par \par Extensive medical records were reviewed both consultative and clinic notes from [facility] and laboratory data going back for 3 years in all consultative notes prior to evaluation of the patient \par In addition extensive time was also spent in reviewing diagnostic studies.\par \par The duration of the review took 40 minutes\par \par \par \par  [phone]

## 2025-06-27 ENCOUNTER — NON-APPOINTMENT (OUTPATIENT)
Age: 35
End: 2025-06-27

## 2025-07-15 ENCOUNTER — NON-APPOINTMENT (OUTPATIENT)
Age: 35
End: 2025-07-15

## 2025-07-16 ENCOUNTER — LABORATORY RESULT (OUTPATIENT)
Age: 35
End: 2025-07-16

## 2025-07-16 ENCOUNTER — APPOINTMENT (OUTPATIENT)
Dept: INFECTIOUS DISEASE | Facility: CLINIC | Age: 35
End: 2025-07-16
Payer: COMMERCIAL

## 2025-07-16 VITALS
SYSTOLIC BLOOD PRESSURE: 149 MMHG | DIASTOLIC BLOOD PRESSURE: 96 MMHG | BODY MASS INDEX: 29.53 KG/M2 | WEIGHT: 218 LBS | OXYGEN SATURATION: 98 % | HEART RATE: 88 BPM | HEIGHT: 72 IN

## 2025-07-16 PROCEDURE — 99214 OFFICE O/P EST MOD 30 MIN: CPT

## 2025-07-17 ENCOUNTER — TRANSCRIPTION ENCOUNTER (OUTPATIENT)
Age: 35
End: 2025-07-17

## 2025-07-17 LAB
C TRACH RRNA SPEC QL NAA+PROBE: NOT DETECTED
HCV AB SER QL: NONREACTIVE
HCV S/CO RATIO: 0.11 S/CO
HIV1 RNA # SERPL NAA+PROBE: NORMAL
HIV1 RNA # SERPL NAA+PROBE: NORMAL COPIES/ML
N GONORRHOEA RRNA SPEC QL NAA+PROBE: NOT DETECTED
SOURCE AMPLIFICATION: NORMAL
VIRAL LOAD INTERP: NORMAL
VIRAL LOAD LOG: NORMAL LG COP/ML

## 2025-07-20 LAB — T PALLIDUM AB SER QL IA: POSITIVE
